# Patient Record
Sex: FEMALE | Race: BLACK OR AFRICAN AMERICAN | NOT HISPANIC OR LATINO | Employment: FULL TIME | URBAN - METROPOLITAN AREA
[De-identification: names, ages, dates, MRNs, and addresses within clinical notes are randomized per-mention and may not be internally consistent; named-entity substitution may affect disease eponyms.]

---

## 2024-03-23 ENCOUNTER — APPOINTMENT (OUTPATIENT)
Dept: RADIOLOGY | Facility: HOSPITAL | Age: 69
End: 2024-03-23

## 2024-03-23 ENCOUNTER — HOSPITAL ENCOUNTER (EMERGENCY)
Facility: HOSPITAL | Age: 69
Discharge: HOME | End: 2024-03-23
Attending: EMERGENCY MEDICINE
Payer: COMMERCIAL

## 2024-03-23 ENCOUNTER — APPOINTMENT (OUTPATIENT)
Dept: CARDIOLOGY | Facility: HOSPITAL | Age: 69
End: 2024-03-23

## 2024-03-23 VITALS
HEIGHT: 62 IN | RESPIRATION RATE: 18 BRPM | WEIGHT: 180 LBS | DIASTOLIC BLOOD PRESSURE: 65 MMHG | TEMPERATURE: 98.1 F | OXYGEN SATURATION: 96 % | HEART RATE: 62 BPM | SYSTOLIC BLOOD PRESSURE: 139 MMHG | BODY MASS INDEX: 33.13 KG/M2

## 2024-03-23 DIAGNOSIS — R06.09 DYSPNEA ON EFFORT: ICD-10-CM

## 2024-03-23 DIAGNOSIS — I71.20 THORACIC AORTIC ANEURYSM WITHOUT RUPTURE, UNSPECIFIED PART (CMS-HCC): ICD-10-CM

## 2024-03-23 DIAGNOSIS — J45.901 EXACERBATION OF ASTHMA, UNSPECIFIED ASTHMA SEVERITY, UNSPECIFIED WHETHER PERSISTENT (HHS-HCC): Primary | ICD-10-CM

## 2024-03-23 LAB
ALBUMIN SERPL BCP-MCNC: 4.1 G/DL (ref 3.4–5)
ALP SERPL-CCNC: 65 U/L (ref 33–136)
ALT SERPL W P-5'-P-CCNC: 27 U/L (ref 7–45)
ANION GAP SERPL CALC-SCNC: 11 MMOL/L (ref 10–20)
AST SERPL W P-5'-P-CCNC: 26 U/L (ref 9–39)
BASOPHILS # BLD AUTO: 0.02 X10*3/UL (ref 0–0.1)
BASOPHILS NFR BLD AUTO: 0.5 %
BILIRUB SERPL-MCNC: 0.7 MG/DL (ref 0–1.2)
BNP SERPL-MCNC: 77 PG/ML (ref 0–99)
BUN SERPL-MCNC: 11 MG/DL (ref 6–23)
CALCIUM SERPL-MCNC: 9.4 MG/DL (ref 8.6–10.3)
CARDIAC TROPONIN I PNL SERPL HS: <3 NG/L (ref 0–13)
CARDIAC TROPONIN I PNL SERPL HS: <3 NG/L (ref 0–13)
CHLORIDE SERPL-SCNC: 105 MMOL/L (ref 98–107)
CO2 SERPL-SCNC: 26 MMOL/L (ref 21–32)
CREAT SERPL-MCNC: 0.87 MG/DL (ref 0.5–1.05)
D DIMER PPP FEU-MCNC: 1174 NG/ML FEU
EGFRCR SERPLBLD CKD-EPI 2021: 72 ML/MIN/1.73M*2
EOSINOPHIL # BLD AUTO: 0.1 X10*3/UL (ref 0–0.7)
EOSINOPHIL NFR BLD AUTO: 2.3 %
ERYTHROCYTE [DISTWIDTH] IN BLOOD BY AUTOMATED COUNT: 13.9 % (ref 11.5–14.5)
FLUAV RNA RESP QL NAA+PROBE: NOT DETECTED
FLUBV RNA RESP QL NAA+PROBE: NOT DETECTED
GLUCOSE SERPL-MCNC: 90 MG/DL (ref 74–99)
HCT VFR BLD AUTO: 37.7 % (ref 36–46)
HGB BLD-MCNC: 11.6 G/DL (ref 12–16)
IMM GRANULOCYTES # BLD AUTO: 0.01 X10*3/UL (ref 0–0.7)
IMM GRANULOCYTES NFR BLD AUTO: 0.2 % (ref 0–0.9)
LYMPHOCYTES # BLD AUTO: 2.26 X10*3/UL (ref 1.2–4.8)
LYMPHOCYTES NFR BLD AUTO: 51 %
MAGNESIUM SERPL-MCNC: 1.75 MG/DL (ref 1.6–2.4)
MCH RBC QN AUTO: 23.9 PG (ref 26–34)
MCHC RBC AUTO-ENTMCNC: 30.8 G/DL (ref 32–36)
MCV RBC AUTO: 78 FL (ref 80–100)
MONOCYTES # BLD AUTO: 0.29 X10*3/UL (ref 0.1–1)
MONOCYTES NFR BLD AUTO: 6.5 %
NEUTROPHILS # BLD AUTO: 1.75 X10*3/UL (ref 1.2–7.7)
NEUTROPHILS NFR BLD AUTO: 39.5 %
NRBC BLD-RTO: 0 /100 WBCS (ref 0–0)
PLATELET # BLD AUTO: 190 X10*3/UL (ref 150–450)
POTASSIUM SERPL-SCNC: 4.2 MMOL/L (ref 3.5–5.3)
PROT SERPL-MCNC: 7.9 G/DL (ref 6.4–8.2)
RBC # BLD AUTO: 4.85 X10*6/UL (ref 4–5.2)
SARS-COV-2 RNA RESP QL NAA+PROBE: NOT DETECTED
SODIUM SERPL-SCNC: 138 MMOL/L (ref 136–145)
WBC # BLD AUTO: 4.4 X10*3/UL (ref 4.4–11.3)

## 2024-03-23 PROCEDURE — 71275 CT ANGIOGRAPHY CHEST: CPT | Performed by: RADIOLOGY

## 2024-03-23 PROCEDURE — 71045 X-RAY EXAM CHEST 1 VIEW: CPT

## 2024-03-23 PROCEDURE — 85379 FIBRIN DEGRADATION QUANT: CPT | Performed by: EMERGENCY MEDICINE

## 2024-03-23 PROCEDURE — 84484 ASSAY OF TROPONIN QUANT: CPT | Performed by: EMERGENCY MEDICINE

## 2024-03-23 PROCEDURE — 83735 ASSAY OF MAGNESIUM: CPT | Performed by: EMERGENCY MEDICINE

## 2024-03-23 PROCEDURE — 94640 AIRWAY INHALATION TREATMENT: CPT

## 2024-03-23 PROCEDURE — 85025 COMPLETE CBC W/AUTO DIFF WBC: CPT | Performed by: EMERGENCY MEDICINE

## 2024-03-23 PROCEDURE — 2550000001 HC RX 255 CONTRASTS: Performed by: EMERGENCY MEDICINE

## 2024-03-23 PROCEDURE — 71275 CT ANGIOGRAPHY CHEST: CPT

## 2024-03-23 PROCEDURE — 87636 SARSCOV2 & INF A&B AMP PRB: CPT | Performed by: EMERGENCY MEDICINE

## 2024-03-23 PROCEDURE — 2500000004 HC RX 250 GENERAL PHARMACY W/ HCPCS (ALT 636 FOR OP/ED): Performed by: EMERGENCY MEDICINE

## 2024-03-23 PROCEDURE — 93005 ELECTROCARDIOGRAM TRACING: CPT

## 2024-03-23 PROCEDURE — 36415 COLL VENOUS BLD VENIPUNCTURE: CPT | Performed by: EMERGENCY MEDICINE

## 2024-03-23 PROCEDURE — 99285 EMERGENCY DEPT VISIT HI MDM: CPT | Mod: 25

## 2024-03-23 PROCEDURE — 71045 X-RAY EXAM CHEST 1 VIEW: CPT | Performed by: STUDENT IN AN ORGANIZED HEALTH CARE EDUCATION/TRAINING PROGRAM

## 2024-03-23 PROCEDURE — 2500000002 HC RX 250 W HCPCS SELF ADMINISTERED DRUGS (ALT 637 FOR MEDICARE OP, ALT 636 FOR OP/ED): Performed by: EMERGENCY MEDICINE

## 2024-03-23 PROCEDURE — 80053 COMPREHEN METABOLIC PANEL: CPT | Performed by: EMERGENCY MEDICINE

## 2024-03-23 PROCEDURE — 83880 ASSAY OF NATRIURETIC PEPTIDE: CPT | Performed by: EMERGENCY MEDICINE

## 2024-03-23 RX ORDER — ALBUTEROL SULFATE 90 UG/1
2 AEROSOL, METERED RESPIRATORY (INHALATION) EVERY 4 HOURS PRN
Qty: 18 G | Refills: 0 | Status: SHIPPED | OUTPATIENT
Start: 2024-03-23 | End: 2024-04-22

## 2024-03-23 RX ORDER — IPRATROPIUM BROMIDE AND ALBUTEROL SULFATE 2.5; .5 MG/3ML; MG/3ML
3 SOLUTION RESPIRATORY (INHALATION) ONCE
Status: COMPLETED | OUTPATIENT
Start: 2024-03-23 | End: 2024-03-23

## 2024-03-23 RX ORDER — PREDNISONE 20 MG/1
40 TABLET ORAL DAILY
Qty: 8 TABLET | Refills: 0 | Status: SHIPPED | OUTPATIENT
Start: 2024-03-23 | End: 2024-03-27

## 2024-03-23 RX ORDER — PREDNISONE 20 MG/1
60 TABLET ORAL ONCE
Status: COMPLETED | OUTPATIENT
Start: 2024-03-23 | End: 2024-03-23

## 2024-03-23 RX ADMIN — PREDNISONE 60 MG: 20 TABLET ORAL at 20:24

## 2024-03-23 RX ADMIN — IOHEXOL 75 ML: 350 INJECTION, SOLUTION INTRAVENOUS at 18:57

## 2024-03-23 RX ADMIN — SODIUM CHLORIDE 500 ML: 9 INJECTION, SOLUTION INTRAVENOUS at 15:58

## 2024-03-23 RX ADMIN — IPRATROPIUM BROMIDE AND ALBUTEROL SULFATE 3 ML: .5; 3 SOLUTION RESPIRATORY (INHALATION) at 15:40

## 2024-03-23 ASSESSMENT — LIFESTYLE VARIABLES
EVER HAD A DRINK FIRST THING IN THE MORNING TO STEADY YOUR NERVES TO GET RID OF A HANGOVER: NO
HAVE YOU EVER FELT YOU SHOULD CUT DOWN ON YOUR DRINKING: NO
HAVE PEOPLE ANNOYED YOU BY CRITICIZING YOUR DRINKING: NO
TOTAL SCORE: 0
EVER FELT BAD OR GUILTY ABOUT YOUR DRINKING: NO

## 2024-03-23 ASSESSMENT — HEART SCORE
RISK FACTORS: 1-2 RISK FACTORS
AGE: 65+
HISTORY: SLIGHTLY SUSPICIOUS
TROPONIN: LESS THAN OR EQUAL TO NORMAL LIMIT
HEART SCORE: 3
ECG: NORMAL

## 2024-03-23 ASSESSMENT — COLUMBIA-SUICIDE SEVERITY RATING SCALE - C-SSRS
2. HAVE YOU ACTUALLY HAD ANY THOUGHTS OF KILLING YOURSELF?: NO
1. IN THE PAST MONTH, HAVE YOU WISHED YOU WERE DEAD OR WISHED YOU COULD GO TO SLEEP AND NOT WAKE UP?: NO
6. HAVE YOU EVER DONE ANYTHING, STARTED TO DO ANYTHING, OR PREPARED TO DO ANYTHING TO END YOUR LIFE?: NO

## 2024-03-23 ASSESSMENT — PAIN DESCRIPTION - DESCRIPTORS: DESCRIPTORS: ACHING

## 2024-03-23 ASSESSMENT — PAIN SCALES - GENERAL
PAINLEVEL_OUTOF10: 0 - NO PAIN
PAINLEVEL_OUTOF10: 0 - NO PAIN
PAINLEVEL_OUTOF10: 3
PAINLEVEL_OUTOF10: 0 - NO PAIN

## 2024-03-23 ASSESSMENT — PAIN DESCRIPTION - PAIN TYPE: TYPE: ACUTE PAIN

## 2024-03-23 ASSESSMENT — PAIN - FUNCTIONAL ASSESSMENT: PAIN_FUNCTIONAL_ASSESSMENT: 0-10

## 2024-03-23 ASSESSMENT — PAIN DESCRIPTION - LOCATION: LOCATION: CHEST

## 2024-03-23 NOTE — ED PROVIDER NOTES
69-year-old female presents emergency department for chief complaint of shortness of breath.  Patient reports over the past 2 days she has been feeling more short of breath than normal.  She states the symptoms are worse with exertion.  She denies fevers pain, or congestion.  She states she has history of asthma but this does not feel like her usual asthma.  She does report some right-sided chest pain that she states improved with ibuprofen and Tylenol yesterday.  No abdominal pain, nausea, vomiting, dysuria, diarrhea, constipation, black or bloody stools.  Patient reports she has been using her home aerosol treatments without relief.  She denies any history of DVT or PE, recent hospitalizations, recent surgery, or calf pain/swelling. She denies any tobacco use, illicit drug use, or regular alcohol use.  Chart review shows significant past medical history of hypothyroidism, asthma, and kidney stones.  Patient is not on any anticoagulation.  She states she has not been on steroids for over a year.      History provided by:  Patient   used: No           ------------------------------------------------------------------------------------------------------------------------------------------    VS: As documented in the triage note and EMR flowsheet from this visit were reviewed.    Review of Systems  Constitutional: no fever, chills reports fatigue  Eyes: no redness, discharge, pain  HENT: no sore throat, nose bleeds, congestion, rhinorrhea   Cardiovascular: Admits to right-sided chest pain that has resolved no, leg edema, palpitations  Respiratory: Reports shortness of breath no cough, wheezing  GI: no nausea, diarrhea, pain, vomiting, constipation, BRBPR, melena  : no dysuria, frequency, hematuria  Musculoskeletal: no neck pain, stiffness,  no joint deformity, swelling  Skin: no rash, erythema, wounds  Neurological: no headache, dizziness, lightheadedness, weakness, numbness, or  tingling  Psychiatric: no suicidal thoughts, confusion, agitation  Metabolic: no polyuria or polydipsia  Hematologic: no increased bleeding or bruising  Immunology: No immunocompromise state    [facility]  Nursing notes reviewed and confirmed by me.  Chart review performed including medications, allergies, and medical, surgical, and family history  Visit Vitals  /65 (BP Location: Right arm, Patient Position: Sitting)   Pulse 62   Temp 36.7 °C (98.1 °F) (Temporal)   Resp 18     Physical Exam  Vitals and nursing note reviewed.   Constitutional:       General: She is not in acute distress.     Appearance: Normal appearance. She is not ill-appearing.   HENT:      Head: Normocephalic and atraumatic.      Right Ear: External ear normal.      Left Ear: External ear normal.      Nose: Nose normal. No congestion or rhinorrhea.      Mouth/Throat:      Mouth: Mucous membranes are moist.      Pharynx: No oropharyngeal exudate or posterior oropharyngeal erythema.   Eyes:      Extraocular Movements: Extraocular movements intact.      Conjunctiva/sclera: Conjunctivae normal.      Pupils: Pupils are equal, round, and reactive to light.   Cardiovascular:      Rate and Rhythm: Normal rate and regular rhythm.      Pulses: Normal pulses.      Heart sounds: Normal heart sounds.   Pulmonary:      Effort: Pulmonary effort is normal. No respiratory distress.      Breath sounds: No stridor. No wheezing, rhonchi or rales.      Comments: Slightly diminished breath sounds bilaterally.  Abdominal:      General: There is no distension.      Palpations: Abdomen is soft.      Tenderness: There is no abdominal tenderness. There is no guarding or rebound.   Musculoskeletal:         General: No swelling or deformity. Normal range of motion.      Cervical back: Normal range of motion and neck supple. No rigidity.      Right lower leg: No edema.      Left lower leg: No edema.      Comments: No calf tenderness    Skin:     General: Skin is warm and  dry.      Capillary Refill: Capillary refill takes less than 2 seconds.      Coloration: Skin is not jaundiced.      Findings: No rash.   Neurological:      General: No focal deficit present.      Mental Status: She is alert and oriented to person, place, and time.      Sensory: No sensory deficit.      Motor: No weakness.   Psychiatric:         Mood and Affect: Mood normal.         Behavior: Behavior normal.        No past medical history on file.   Past Surgical History:   Procedure Laterality Date     ASPIRATION INJECTION INTERMEDIATE JOINT  1/10/2020     ASPIRATION INJECTION INTERMEDIATE JOINT 1/10/2020 [facility]      Social History     Socioeconomic History    Marital status:      Spouse name: Not on file    Number of children: Not on file    Years of education: Not on file    Highest education level: Not on file   Occupational History    Not on file   Tobacco Use    Smoking status: Not on file    Smokeless tobacco: Not on file   Substance and Sexual Activity    Alcohol use: Not on file    Drug use: Not on file    Sexual activity: Not on file   Other Topics Concern    Not on file   Social History Narrative    Not on file     Social Determinants of Health     Financial Resource Strain: Not on file   Food Insecurity: Not on file   Transportation Needs: Not on file   Physical Activity: Not on file   Stress: Not on file   Social Connections: Not on file   Intimate Partner Violence: Not on file   Housing Stability: Not on file      ------------------------------------------------------------------------------------------------------------------------------------------  CT angio chest for pulmonary embolism   Final Result   1. No evidence of pulmonary embolism to the level of the segmental   arteries. Small subsegmental filling defects can not be entirely   excluded.   2. Enlargement of the main pulmonary artery suggesting pulmonary   arterial hypertension.   3. Borderline aneurysmal dilatation  of the ascending thoracic aorta   similar to prior exam. Continued surveillance recommended.   4. No focal airspace disease.        MACRO:   None        Signed by: Reji Wolf 3/23/2024 7:43 PM   Dictation workstation:   [id number]      XR chest 1 view   Final Result   No acute cardiopulmonary process.             MACRO:   None        Signed by: Radha Solorzano 3/23/2024 4:46 PM   Dictation workstation:   [id number]         Labs Reviewed   CBC WITH AUTO DIFFERENTIAL - Abnormal       Result Value    WBC 4.4      nRBC 0.0      RBC 4.85      Hemoglobin 11.6 (*)     Hematocrit 37.7      MCV 78 (*)     MCH 23.9 (*)     MCHC 30.8 (*)     RDW 13.9      Platelets 190      Neutrophils % 39.5      Immature Granulocytes %, Automated 0.2      Lymphocytes % 51.0      Monocytes % 6.5      Eosinophils % 2.3      Basophils % 0.5      Neutrophils Absolute 1.75      Immature Granulocytes Absolute, Automated 0.01      Lymphocytes Absolute 2.26      Monocytes Absolute 0.29      Eosinophils Absolute 0.10      Basophils Absolute 0.02     D-DIMER, VTE EXCLUSION - Abnormal    D-Dimer, Quantitative VTE Exclusion 1,174 (*)     Narrative:     The VTE Exclusion D-Dimer assay is reported in ng/mL Fibrinogen Equivalent Units (FEU).    Per 's instructions for use, a value of less than 500 ng/mL (FEU) may help to exclude DVT or PE in outpatients when the assay is used with a clinical pretest probability assessment.([facility] must utilize and document eCalc 'Wells Score Deep Vein Thrombosis Risk' for DVT exclusion only. Emergency Department should utilize  Guidelines for Emergency Department Use of the VTE Exclusion D-Dimer and Clinical Pretest probability assessment model for DVT or PE exclusion.)   COMPREHENSIVE METABOLIC PANEL - Normal    Glucose 90      Sodium 138      Potassium 4.2      Chloride 105      Bicarbonate 26      Anion Gap 11      Urea Nitrogen 11      Creatinine 0.87      eGFR 72      Calcium 9.4      Albumin 4.1       Alkaline Phosphatase 65      Total Protein 7.9      AST 26      Bilirubin, Total 0.7      ALT 27     MAGNESIUM - Normal    Magnesium 1.75     B-TYPE NATRIURETIC PEPTIDE - Normal    BNP 77      Narrative:        <100 pg/mL - Heart failure unlikely  100-299 pg/mL - Intermediate probability of acute heart                  failure exacerbation. Correlate with clinical                  context and patient history.    >=300 pg/mL - Heart Failure likely. Correlate with clinical                  context and patient history.    BNP testing is performed using different testing methodology at [facility] than at other [facility] hospitals. Direct result comparisons should only be made within the same method.      SARS-COV-2 AND INFLUENZA A/B PCR - Normal    Flu A Result Not Detected      Flu B Result Not Detected      Coronavirus 2019, PCR Not Detected      Narrative:     This assay has received FDA Emergency Use Authorization (EUA) and  is only authorized for the duration of time that circumstances exist to justify the authorization of the emergency use of in vitro diagnostic tests for the detection of SARS-CoV-2 virus and/or diagnosis of COVID-19 infection under section 564(b)(1) of the Act, 21 U.S.C. 360bbb-3(b)(1). Testing for SARS-CoV-2 is only recommended for patients who meet current clinical and/or epidemiological criteria as defined by federal, state, or local public health directives. This assay is an in vitro diagnostic nucleic acid amplification test for the qualitative detection of SARS-CoV-2, Influenza A, and Influenza B from nasopharyngeal specimens and has been validated for use at [facility]. Negative results do not preclude COVID-19 infections or Influenza A/B infections, and should not be used as the sole basis for diagnosis, treatment, or other management decisions. If Influenza A/B and RSV PCR results are negative, testing for Parainfluenza virus, Adenovirus and  Metapneumovirus is routinely performed for [facility] pediatric oncology and intensive care inpatients, and is available on other patients by placing an add-on request.    SERIAL TROPONIN-INITIAL - Normal    Troponin I, High Sensitivity <3      Narrative:     Less than 99th percentile of normal range cutoff-  Female and children under 18 years old <14 ng/L; Male <21 ng/L: Negative  Repeat testing should be performed if clinically indicated.     Female and children under 18 years old 14-50 ng/L; Male 21-50 ng/L:  Consistent with possible cardiac damage and possible increased clinical   risk. Serial measurements may help to assess extent of myocardial damage.     >50 ng/L: Consistent with cardiac damage, increased clinical risk and  myocardial infarction. Serial measurements may help assess extent of   myocardial damage.      NOTE: Children less than 1 year old may have higher baseline troponin   levels and results should be interpreted in conjunction with the overall   clinical context.     NOTE: Troponin I testing is performed using a different   testing methodology at [facility] than at other   [facility]. Direct result comparisons should only   be made within the same method.   SERIAL TROPONIN, 1 HOUR - Normal    Troponin I, High Sensitivity <3      Narrative:     Less than 99th percentile of normal range cutoff-  Female and children under 18 years old <14 ng/L; Male <21 ng/L: Negative  Repeat testing should be performed if clinically indicated.     Female and children under 18 years old 14-50 ng/L; Male 21-50 ng/L:  Consistent with possible cardiac damage and possible increased clinical   risk. Serial measurements may help to assess extent of myocardial damage.     >50 ng/L: Consistent with cardiac damage, increased clinical risk and  myocardial infarction. Serial measurements may help assess extent of   myocardial damage.      NOTE: Children less than 1 year old may have higher baseline troponin    levels and results should be interpreted in conjunction with the overall   clinical context.     NOTE: Troponin I testing is performed using a different   testing methodology at [facility] than at other   [facility] hospitals. Direct result comparisons should only   be made within the same method.   TROPONIN SERIES- (INITIAL, 1 HR)    Narrative:     The following orders were created for panel order Troponin I Series, High Sensitivity (0, 1 HR).  Procedure                               Abnormality         Status                     ---------                               -----------         ------                     Troponin I, High Sensitiv...[[id number]]  Normal              Final result               Troponin, High Sensitivi...[[id number]]  Normal              Final result                 Please view results for these tests on the individual orders.        Medical Decision Making  EKG interpreted by ED physician: Normal sinus rhythm rate of 60.  PA, QRS, QTc intervals all within normal limits.  No significant ST elevations or depressions.  No significant Q waves.  Good R wave progression.  Normal axis.    EKG interpreted by ED physician: Sinus bradycardia rate of 58.  PA, QRS, QTc intervals are within normal limits.  No significant ST elevations or depressions.  No significant Q waves.  Good R wave progression.  Normal axis.    69-year-old female presents emergency department with chief complaint of shortness of breath worse with exertion.  On my exam the patient is afebrile nontoxic.  She is not in any respiratory distress, but has slightly diminished breath sounds.  Patient given aerosol treatment and on reevaluation has improved along movement.  She also reports that her symptoms have improved some.  She is subsequently given prednisone for presumed asthma exacerbation.  Cardiac enzymes x 2 and EKG showed no findings of acute ACS.  Patient does not have significantly elevated heart score.  Flu and  COVID testing are negative.  CMP shows no significant metabolic abnormalities.  CBC shows mild anemia that is at baseline no significant leukocytosis patient does not have findings of sepsis.  BNP within normal range and not suspect heart failure.  Chest x-ray shows no acute cardiopulmonary process such as pneumonia, pleural effusion, pulmonary edema.  Patient's D-dimer was positive and subsequent PE study does not show pulmonary embolus, but does show findings of pulmonary hypertension as well as some borderline aneurysmal dilation of the ascending thoracic aorta.  I advised patient of this incidental finding.  I recommended patient follow-up with cardiology regarding her symptoms.  Patient also advised to follow-up with her primary care doctor and she states she follows with a pulmonologist as well.  Patient given prednisone for home and refill of her albuterol.  We discussed reasons to return to the ED.  Patient comfortable returning home.  She was ambulated in department and maintained adequate oxygen saturation on room air.       Diagnoses as of 03/23/24 2334   Exacerbation of asthma, unspecified asthma severity, unspecified whether persistent   Dyspnea on effort   Thoracic aortic aneurysm without rupture, unspecified part (CMS/HCC) - borderline dilation       1. Exacerbation of asthma, unspecified asthma severity, unspecified whether persistent  albuterol 90 mcg/actuation inhaler    predniSONE (Deltasone) 20 mg tablet      2. Dyspnea on effort        3. Thoracic aortic aneurysm without rupture, unspecified part (CMS/HCC)      borderline dilation          Procedures     This note was dictated using dragon software and may contain errors related to dictation interpretation errors.      Hoang Hilton, DO  03/23/24 [id number]

## 2024-03-24 LAB
ATRIAL RATE: 58 BPM
ATRIAL RATE: 60 BPM
P AXIS: 51 DEGREES
P AXIS: 59 DEGREES
P OFFSET: 192 MS
P OFFSET: 196 MS
P ONSET: 135 MS
P ONSET: 135 MS
PR INTERVAL: 170 MS
PR INTERVAL: 184 MS
Q ONSET: 220 MS
Q ONSET: 227 MS
QRS COUNT: 9 BEATS
QRS COUNT: 9 BEATS
QRS DURATION: 86 MS
QRS DURATION: 86 MS
QT INTERVAL: 410 MS
QT INTERVAL: 426 MS
QTC CALCULATION(BAZETT): 410 MS
QTC CALCULATION(BAZETT): 418 MS
QTC FREDERICIA: 410 MS
QTC FREDERICIA: 421 MS
R AXIS: -6 DEGREES
R AXIS: 72 DEGREES
T AXIS: 14 DEGREES
T AXIS: 36 DEGREES
T OFFSET: 425 MS
T OFFSET: 440 MS
VENTRICULAR RATE: 58 BPM
VENTRICULAR RATE: 60 BPM

## 2024-03-24 NOTE — DISCHARGE INSTRUCTIONS
Follow up with your primary care doctor, cardiology and pulmonology. Return to the ER if symptoms worsen or change. Use treatments every 4 hours while symptomatic. Take medications as prescribed. You had an incidental finding of dilation of your thoracic aorta. This should be monitored by your doctor.

## 2024-10-18 ENCOUNTER — LAB (OUTPATIENT)
Dept: LAB | Facility: LAB | Age: 69
End: 2024-10-18
Payer: MEDICARE

## 2024-10-18 DIAGNOSIS — N18.2 CHRONIC KIDNEY DISEASE, STAGE 2 (MILD): Primary | ICD-10-CM

## 2024-10-18 DIAGNOSIS — E78.49 OTHER HYPERLIPIDEMIA: ICD-10-CM

## 2024-10-18 DIAGNOSIS — E03.8 OTHER SPECIFIED HYPOTHYROIDISM: ICD-10-CM

## 2024-10-18 LAB
ALBUMIN SERPL BCP-MCNC: 4.3 G/DL (ref 3.4–5)
ALP SERPL-CCNC: 64 U/L (ref 33–136)
ALT SERPL W P-5'-P-CCNC: 28 U/L (ref 7–45)
ANION GAP SERPL CALC-SCNC: 10 MMOL/L (ref 10–20)
AST SERPL W P-5'-P-CCNC: 27 U/L (ref 9–39)
BASOPHILS # BLD AUTO: 0.02 X10*3/UL (ref 0–0.1)
BASOPHILS NFR BLD AUTO: 0.4 %
BILIRUB SERPL-MCNC: 0.8 MG/DL (ref 0–1.2)
BUN SERPL-MCNC: 21 MG/DL (ref 6–23)
CALCIUM SERPL-MCNC: 9.5 MG/DL (ref 8.6–10.3)
CHLORIDE SERPL-SCNC: 104 MMOL/L (ref 98–107)
CHOLEST SERPL-MCNC: 193 MG/DL (ref 0–199)
CHOLESTEROL/HDL RATIO: 2.1
CO2 SERPL-SCNC: 29 MMOL/L (ref 21–32)
CREAT SERPL-MCNC: 1 MG/DL (ref 0.5–1.05)
EGFRCR SERPLBLD CKD-EPI 2021: 61 ML/MIN/1.73M*2
EOSINOPHIL # BLD AUTO: 0.15 X10*3/UL (ref 0–0.7)
EOSINOPHIL NFR BLD AUTO: 3.2 %
ERYTHROCYTE [DISTWIDTH] IN BLOOD BY AUTOMATED COUNT: 14.3 % (ref 11.5–14.5)
GLUCOSE SERPL-MCNC: 89 MG/DL (ref 74–99)
HCT VFR BLD AUTO: 37.6 % (ref 36–46)
HDLC SERPL-MCNC: 91.3 MG/DL
HGB BLD-MCNC: 11.4 G/DL (ref 12–16)
IMM GRANULOCYTES # BLD AUTO: 0 X10*3/UL (ref 0–0.7)
IMM GRANULOCYTES NFR BLD AUTO: 0 % (ref 0–0.9)
LDLC SERPL CALC-MCNC: 85 MG/DL
LYMPHOCYTES # BLD AUTO: 2.5 X10*3/UL (ref 1.2–4.8)
LYMPHOCYTES NFR BLD AUTO: 53.3 %
MCH RBC QN AUTO: 23.9 PG (ref 26–34)
MCHC RBC AUTO-ENTMCNC: 30.3 G/DL (ref 32–36)
MCV RBC AUTO: 79 FL (ref 80–100)
MONOCYTES # BLD AUTO: 0.37 X10*3/UL (ref 0.1–1)
MONOCYTES NFR BLD AUTO: 7.9 %
NEUTROPHILS # BLD AUTO: 1.65 X10*3/UL (ref 1.2–7.7)
NEUTROPHILS NFR BLD AUTO: 35.2 %
NON HDL CHOLESTEROL: 102 MG/DL (ref 0–149)
NRBC BLD-RTO: 0 /100 WBCS (ref 0–0)
PLATELET # BLD AUTO: 188 X10*3/UL (ref 150–450)
POTASSIUM SERPL-SCNC: 4.4 MMOL/L (ref 3.5–5.3)
PROT SERPL-MCNC: 7.5 G/DL (ref 6.4–8.2)
RBC # BLD AUTO: 4.77 X10*6/UL (ref 4–5.2)
SODIUM SERPL-SCNC: 139 MMOL/L (ref 136–145)
TRIGL SERPL-MCNC: 84 MG/DL (ref 0–149)
TSH SERPL-ACNC: 1.05 MIU/L (ref 0.44–3.98)
VLDL: 17 MG/DL (ref 0–40)
WBC # BLD AUTO: 4.7 X10*3/UL (ref 4.4–11.3)

## 2024-10-18 PROCEDURE — 84443 ASSAY THYROID STIM HORMONE: CPT

## 2024-10-18 PROCEDURE — 80053 COMPREHEN METABOLIC PANEL: CPT

## 2024-10-18 PROCEDURE — 36415 COLL VENOUS BLD VENIPUNCTURE: CPT

## 2024-10-18 PROCEDURE — 85025 COMPLETE CBC W/AUTO DIFF WBC: CPT

## 2024-10-18 PROCEDURE — 80061 LIPID PANEL: CPT

## 2024-10-23 ENCOUNTER — HOSPITAL ENCOUNTER (OUTPATIENT)
Dept: RADIOLOGY | Facility: CLINIC | Age: 69
Discharge: HOME | End: 2024-10-23
Payer: MEDICARE

## 2024-10-23 VITALS — BODY MASS INDEX: 33.13 KG/M2 | WEIGHT: 180 LBS | HEIGHT: 62 IN

## 2024-10-23 DIAGNOSIS — Z13.820 ENCOUNTER FOR SCREENING FOR OSTEOPOROSIS: ICD-10-CM

## 2024-10-23 DIAGNOSIS — Z78.0 ASYMPTOMATIC MENOPAUSAL STATE: ICD-10-CM

## 2024-10-23 DIAGNOSIS — Z12.31 ENCOUNTER FOR SCREENING MAMMOGRAM FOR MALIGNANT NEOPLASM OF BREAST: ICD-10-CM

## 2024-10-23 PROCEDURE — 77067 SCR MAMMO BI INCL CAD: CPT

## 2024-10-23 PROCEDURE — 77080 DXA BONE DENSITY AXIAL: CPT

## 2024-10-23 PROCEDURE — 77063 BREAST TOMOSYNTHESIS BI: CPT | Performed by: RADIOLOGY

## 2024-10-23 PROCEDURE — 77080 DXA BONE DENSITY AXIAL: CPT | Performed by: RADIOLOGY

## 2024-10-23 PROCEDURE — 77067 SCR MAMMO BI INCL CAD: CPT | Performed by: RADIOLOGY

## 2024-11-26 ENCOUNTER — HOSPITAL ENCOUNTER (OUTPATIENT)
Dept: RADIOLOGY | Facility: HOSPITAL | Age: 69
Discharge: HOME | End: 2024-11-26
Payer: MEDICARE

## 2024-11-26 DIAGNOSIS — R92.8 OTHER ABNORMAL AND INCONCLUSIVE FINDINGS ON DIAGNOSTIC IMAGING OF BREAST: ICD-10-CM

## 2024-11-26 PROCEDURE — 76642 ULTRASOUND BREAST LIMITED: CPT | Mod: LEFT SIDE | Performed by: RADIOLOGY

## 2024-11-26 PROCEDURE — G0279 TOMOSYNTHESIS, MAMMO: HCPCS | Mod: LEFT SIDE | Performed by: RADIOLOGY

## 2024-11-26 PROCEDURE — 77061 BREAST TOMOSYNTHESIS UNI: CPT | Mod: LT

## 2024-11-26 PROCEDURE — 76642 ULTRASOUND BREAST LIMITED: CPT | Mod: LT

## 2024-11-26 PROCEDURE — 77065 DX MAMMO INCL CAD UNI: CPT | Mod: LEFT SIDE | Performed by: RADIOLOGY

## 2024-12-10 ENCOUNTER — HOSPITAL ENCOUNTER (OUTPATIENT)
Dept: RADIOLOGY | Facility: CLINIC | Age: 69
Discharge: HOME | End: 2024-12-10
Payer: MEDICARE

## 2024-12-10 ENCOUNTER — OFFICE VISIT (OUTPATIENT)
Dept: ORTHOPEDIC SURGERY | Facility: CLINIC | Age: 69
End: 2024-12-10
Payer: MEDICARE

## 2024-12-10 DIAGNOSIS — M54.50 LUMBAR PAIN: ICD-10-CM

## 2024-12-10 DIAGNOSIS — M54.10 BACK PAIN WITH RADICULOPATHY: ICD-10-CM

## 2024-12-10 DIAGNOSIS — M54.12 CERVICAL RADICULOPATHY: Primary | ICD-10-CM

## 2024-12-10 DIAGNOSIS — M54.12 CERVICAL RADICULOPATHY: ICD-10-CM

## 2024-12-10 PROCEDURE — 72110 X-RAY EXAM L-2 SPINE 4/>VWS: CPT | Performed by: RADIOLOGY

## 2024-12-10 PROCEDURE — 1159F MED LIST DOCD IN RCRD: CPT | Performed by: PHYSICIAN ASSISTANT

## 2024-12-10 PROCEDURE — 72050 X-RAY EXAM NECK SPINE 4/5VWS: CPT | Performed by: RADIOLOGY

## 2024-12-10 PROCEDURE — 1036F TOBACCO NON-USER: CPT | Performed by: PHYSICIAN ASSISTANT

## 2024-12-10 PROCEDURE — 72110 X-RAY EXAM L-2 SPINE 4/>VWS: CPT

## 2024-12-10 PROCEDURE — 72050 X-RAY EXAM NECK SPINE 4/5VWS: CPT

## 2024-12-10 PROCEDURE — 99214 OFFICE O/P EST MOD 30 MIN: CPT | Performed by: PHYSICIAN ASSISTANT

## 2024-12-10 NOTE — PROGRESS NOTES
Established patient to the practice today seen over 2 years ago.  Comes the office complaining of follow-up persistent back pain going down the left leg she was doing okay for a while there.  Now she is kind of semiretired this was initially [employer]'s [employer]. but now it is not.  She also complains of neck pain with burning to both her arms upper arms no numbness or tingling there is no bowel or bladder complaints no saddle anesthesia no gait or balance changes no recent trauma accidents or falls to cause it no spine surgeries in the past no recent treatment in the past.  No fevers chills nausea vomiting or night sweats.    We looked at patient's recent blood work.  Checked a metabolic panel glucose 89 sodium 139 potassium 4.4 patient had a recent bone density study that was done showing a -1.4 we looked at primary doctors notes please check medication list I do not see that she is on any type of statin medication to cause muscular aches and pains    Physical exam: Well-nourished, well kept.  No lymphangitis or lymphadenopathy in the examined extremities.  Good perfusion to the extremities ×4.  Radial and dorsalis pedis pulses 2+.  Capillary refill to all 4 digits brisk.  No distal edema x 4.  Gait normal.  Can walk on heels and toes.  Decreased range of motion flexion-extension rotation cervical spine tender good strength no instability.  Decreased range of motion flexion extension rotation lumbar spine tender good strength no instability.  Examination of the upper extremities reveals no point tenderness, swelling, or deformity.  Range of motion of the shoulders, elbows, wrists, and fingers are all full without crepitance, instability, or exacerbation of pain.  Strength is 5/5 throughout.  Examination of the lower extremities reveals no point tenderness, swelling, or deformity.  Range of motion of the hips, knees, and ankles are full without crepitance, instability, or exacerbation of pain.  Strength is 5/5  throughout.  No redness, abrasions, or lesions on all 4 extremities, head and neck, or trunk.  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  Compared to prior x-rays and report reviewed as well I do not see much change.  She also an MRI back then in 2022 showing a herniated disc at L4-5 with an extruded fragment that goes out and up into the canal up to the middle of the pedicle of the L4 vertebral body.  X-ray cervical spine taken today AP lateral show arthritic degenerative changes with no fractures.    Assessment: Patient with neck pain left leg symptoms.  Last MRI was 2 years ago her symptoms are actually worse right now that needs further treatment she also has neck pain with burning pain to both her arms which I think is cervical related so organ to get that evaluated.    Plan: Ordered some physical therapy done at our [facility] office.  Cervical and lumbar will get an MRI cervical and lumbar spine.  And she will follow-up after those films

## 2024-12-31 ENCOUNTER — HOSPITAL ENCOUNTER (OUTPATIENT)
Dept: RADIOLOGY | Facility: HOSPITAL | Age: 69
Discharge: HOME | End: 2024-12-31
Payer: MEDICARE

## 2024-12-31 DIAGNOSIS — M54.50 LUMBAR PAIN: ICD-10-CM

## 2024-12-31 DIAGNOSIS — M54.12 CERVICAL RADICULOPATHY: ICD-10-CM

## 2024-12-31 DIAGNOSIS — M54.10 BACK PAIN WITH RADICULOPATHY: ICD-10-CM

## 2024-12-31 PROCEDURE — 72141 MRI NECK SPINE W/O DYE: CPT

## 2024-12-31 PROCEDURE — 72141 MRI NECK SPINE W/O DYE: CPT | Performed by: RADIOLOGY

## 2024-12-31 PROCEDURE — 72148 MRI LUMBAR SPINE W/O DYE: CPT

## 2024-12-31 PROCEDURE — 72148 MRI LUMBAR SPINE W/O DYE: CPT | Performed by: RADIOLOGY

## 2025-01-02 ENCOUNTER — EVALUATION (OUTPATIENT)
Dept: PHYSICAL THERAPY | Facility: CLINIC | Age: 70
End: 2025-01-02
Payer: MEDICARE

## 2025-01-02 DIAGNOSIS — M54.50 LUMBAR PAIN: ICD-10-CM

## 2025-01-02 DIAGNOSIS — M54.12 CERVICAL RADICULOPATHY: ICD-10-CM

## 2025-01-02 DIAGNOSIS — M54.10 BACK PAIN WITH RADICULOPATHY: ICD-10-CM

## 2025-01-02 PROCEDURE — 97110 THERAPEUTIC EXERCISES: CPT | Mod: GP

## 2025-01-02 PROCEDURE — 97162 PT EVAL MOD COMPLEX 30 MIN: CPT | Mod: GP

## 2025-01-02 ASSESSMENT — ENCOUNTER SYMPTOMS
DEPRESSION: 0
LOSS OF SENSATION IN FEET: 1
OCCASIONAL FEELINGS OF UNSTEADINESS: 0

## 2025-01-02 NOTE — PROGRESS NOTES
Physical Therapy Evaluation    Patient Name: Ida Wynne  MRN: [id number]  PT Evaluation Time Entry  PT Evaluation (Moderate) Time Entry: 32  PT Therapeutic Procedures Time Entry  Therapeutic Exercise Time Entry: 8                 Current Problem  1. Lumbar pain  Referral to Physical Therapy      2. Cervical radiculopathy  Referral to Physical Therapy      3. Back pain with radiculopathy  Referral to Physical Therapy        Insurance    Insurance reviewed   Visit number: 1  [employer] BOA COPAY 40   DED 0 COVERAGE 100 OOP 0 AUTH IS REQ AFTER EVAL       Subjective   General:  Pt comes in today with complaints of both LBP and cervical pain with radicular symptoms into BL UE. She states at night she tends to have more pain and N/T. She states her low back does bother her more at this time. She states it feels like something is being pushed into her spine. She states she does have some radiation of pain into the LLE. She states she does get relief when she sleeps on her stomach. She reports this has been going on for about 3 months and started in October. She states they have ruled out any type of cardiac involvement. She states her pain is rated 4-5/10 in both neck and back. She denies any bowel/bladder incontinence, denies any saddle anesthesia, denies SoB, denies any fever or chills. She reports the hardest thing for her to do is bending over. She states the pain her back is midline. She reports her cervical pain is mainly just radiating into her arms which does not extend past the elbow at this time. Pt denies any vision changes, dizziness, LoC. She states her back is the worst as well when she sits for prolonged periods of time. PMH: Asthma, hysterectomy, umbilical hernia repair, thyroid surgery  Occupation: Semi-retired  PLOF: Independent with all activities  Goal for Therapy: Relief without having surgery  Home Environment: House, 2 ZHOU, 3 levels, lives with   R hand  dominant  Precautions:    Pain:  REDUCES SYMPTOMS: Tylenol - helps the back    PROVOKES SYMPTOMS: Bending over, sitting for prolonged periods of time, sleeping    Red Flags: Do you have any of the following? No  Fever/chills, unexplained weight changes, dizziness/fainting, unexplained change in bowel or bladder functions, unexplained malaise or muscle weakness, , numbness or tingling  Does report night sweats    Reviewed medical screening form with pt and medical screening assessed    Imaging:   IMPRESSION: MRI Cervical 12/31/24  Multilevel cervical spondylosis with bulging disc and marginal  osteophyte complex, greater at C3-4. There is moderate central canal  narrowing at this level with some effacement of the ventral surface  of the spinal cord to left of midline. There is some flattening of  the anterior surface of the spinal cord at C4-5 as well.      Facet and uncovertebral arthrosis with multilevel bilateral neural  foraminal narrowing which is greater at C3-4, C4-5, and C5-6 on the  left.      Straightening of the cervical lordosis. There is no acute osseous  abnormality.    IMPRESSION: MRI Lumbar 12/31/24  Mild multilevel discogenic degenerative changes of the lumbar spine  with multilevel bulging disc and mild central canal narrowing as  detailed above. There is mild multilevel neural foraminal narrowing  with mild to moderate narrowing at the L4-5 level, greater on the  left.      No acute osseous abnormality.  Objective   CERVICAL Examination          PALPATION: TTP BL UT            POSTURE: rounded shoulder, forward head posture    AROM    Cervical flexion: WNL    Cervical extension: WNL    Cervical sidebending Right: limited 25%   Sidebending Left: limited 25%    Cervical rotation Right: limited 25%    Rotation Left: limited 25%    MMT    Shoulder deltoid flexion right: 5    Deltoid flexion left: 5    (C5) Shoulder deltoid abduction right: 5   Deltoid abduction left: 5    Shoulder ER @ 0 degrees  abduction right: 5    ER @ 0 degrees abduction left: 5    Shoulder IR @ 0 degrees abduction right: 5    IR @ 0 degrees abduction left: 5    (C6) Biceps brachii right: 5    Biceps brachii left: 5    (C7) Triceps brachii right: 5    Triceps brachii left: 5    (C7) Wrist flexors right: 5      Wrist flexors left: 5      (C6) Wrist extensors right: 5     Wrist extensors left: 5      (C8) Thumb extension right: 4+      Thumb extension left: 4+      (T1) Finger abduction right: 4+      Finger abduction left: 4+       level 2 on right: 40       level 2 left: 30                    Special Tests  Spurlings:  Right -  Left -  Distraction: Right -  Left + (improvement in pain with light traction)  Rotation Less than 60 degrees to involved side: Right -  Left -  Transverse Ligament Test: Right -  Left -      LUMBAR Examination  PALPATION: TTP Sacrum, BL piriformis track              GAIT: Antalgic, trendelenburg            POSTURE: Dec lordosis in seated position    AROM    Lumbar flexion: Dec 50% P!      Lumbar extension: limited 25%      Lumbar sidebending right: limited 25%      Sidebending left: limited 25%      Lumbar rotation right: limited 25%     Rotation left: limited 25%      Hip flexion right:  0-90    Hip flexion left: 0-100      Hip ER supine right: WNL     Hip ER supine left: WNL      Hip IR supine right: WNL     Hip IR supine left: WNL      MMT:    (L3-L4) Right quadriceps: 4+     Left quadriceps: 4+      (L1-L2) Right iliopsoas: 5      Left iliopsoas: 4      (S2) Right hip abductors supine: 4+     Left hip abductors supine: 4+      (L1-L2) Right hip adductors supine: 4+     Left hip adductors supine: 4+      (L5-S1) Right gluteus daylin: 4     Left gluteus daylin: 4      (S1-S2) Right hamstrings: 5      Left hamstrings: 5      Lower abdominals: fair      Upper abdominals: fair      Flexibility:    Right iliopsoas: limited     Left iliopsoas: limited      Right rectus femoris:   limited    Left rectus femoris: limited      Right hamstring: limited     Left hamstring: limited      Right piriformis: limited      Left piriformis: limited      Right quadriceps: limited     Left quadriceps: limited        Level:   Dermatome:                  L1         WNL                   L2         WNL                                        L3         WNL                                          L4         WNL                                           L5         WNL                            S1         WNL                                                   Special Tests:  Active Straight Leg Raise Test: Right -  Left +  Prone Instability Test: Right -  Left -  Slump Test: Right -  Left -   Crossed Straight Leg Test: Right -  Left -      Outcome Measures:  Other Measures  Oswestry Disablity Index (MIKEY): 26%     Treatment: See HEP below  Measurements taken  Prone on elbows x 2mins - relief of radicular symptoms  Scap retractions x10  Supine piriformis stretch 2x30s  EDUCATION/HEP:  Access Code: [email or web address]  URL: [email or web address]  Date: 01/02/2025  Prepared by: Mounika Chowdhury    Exercises  - Supine Piriformis Stretch with Foot on Ground  - 1-2 x daily - 7 x weekly - 3 sets - 30s hold  - Static Prone on Elbows  - 1-2 x daily - 7 x weekly - 1 sets - 2 mins hold  - Seated Scapular Retraction  - 2 x daily - 7 x weekly - 2 sets - 10 reps  Assessment:  70 y/o pt who presents with cervical pain with radiation into BLUE, LBP with radiation into LLE, dec cervical/lumbar ROM, dec UE/LE strength, dec flexibility, dec functional mobility, and abnormal gait mechanics. Symptoms consistent with cervical radiculopathy and lumbar radiculopathy. Pt with relief of radicular in UE symptoms with gentle traction. Pt with relief of radicular in LLE symptoms in prone. Functional limitations include standing, walking, sitting for prolonged times, sleeping, and self care tasks. Pt denies any dizziness,  changes in vision, LoC, saddle anesthesia, bowel/bladder incontinence, or fever/chills. MRI performed on both cervical spine and lumbar spine. Pt will benefit from skilled therapy in order to improve pain, ROM, strength, flexibility, functional mobility, and gait mechanics.    Clinical Presentation: Stable     Level of Complexity:  Moderate     Goals:  Pt will report dec of 6% on NDI (MDC) in order to improve functional mobility  Pt will be independent with HEP  Pt will demonstrate an increase in neck ROM to WNL in order to return to ADLs  Pt will demonstrate global shoulder strength to 4+/5 in order to return to ADLs  Pt will report dec in pain to 0-1/10 in order to improve functional mobility  Pt will report 75% improvement in function in order to return to ADLs     Pt will report dec of 6% on MIKEY (MDC) in order to improve functional mobility  Pt will be independent with HEP  Pt will demonstrate an increase in Lumbar  ROM to WNL in order to return to ADLs  Pt will demonstrate global Hip strength to 4+/5 in order to return to ADLs  Pt will report dec in pain by 1-2 points in order to improve functional mobility  Pt will report 75% improvement in function in order to return to ADLs        Plan  Treatment/Interventions: Cryotherapy, Education/ Instruction, Gait training, Manual therapy, Mechanical traction, Neuromuscular re-education, Self care/ home management, Therapeutic activities, Therapeutic exercises  PT Plan: Skilled PT  PT Frequency: 1 time per week  Duration: 6 weeks  Onset Date: 10/01/24  Certification Period Start Date: 01/02/25  Certification Period End Date: 04/02/25  Number of Treatments Authorized: BOA  Rehab Potential: Good  Plan of Care Agreement: Patient     18

## 2025-01-10 ENCOUNTER — TREATMENT (OUTPATIENT)
Dept: PHYSICAL THERAPY | Facility: CLINIC | Age: 70
End: 2025-01-10
Payer: MEDICARE

## 2025-01-10 DIAGNOSIS — M54.10 BACK PAIN WITH RADICULOPATHY: ICD-10-CM

## 2025-01-10 DIAGNOSIS — M54.12 CERVICAL RADICULOPATHY: ICD-10-CM

## 2025-01-10 DIAGNOSIS — M54.50 LUMBAR PAIN: ICD-10-CM

## 2025-01-10 PROCEDURE — 97110 THERAPEUTIC EXERCISES: CPT | Mod: GP,CQ

## 2025-01-10 PROCEDURE — 97140 MANUAL THERAPY 1/> REGIONS: CPT | Mod: GP,CQ

## 2025-01-10 ASSESSMENT — PAIN - FUNCTIONAL ASSESSMENT: PAIN_FUNCTIONAL_ASSESSMENT: 0-10

## 2025-01-10 ASSESSMENT — PAIN SCALES - GENERAL: PAINLEVEL_OUTOF10: 3

## 2025-01-10 NOTE — PROGRESS NOTES
"Physical Therapy Treatment    Patient Name: Ida Wynne  MRN: [id number]  Today's Date: 1/10/2025  Time Calculation  Start Time: 1004  Stop Time: 1101  Time Calculation (min): 57 min  PT Therapeutic Procedures Time Entry  Manual Therapy Time Entry: 30  Therapeutic Exercise Time Entry: 25       Current Problem  1. Lumbar pain  Follow Up In Physical Therapy      2. Cervical radiculopathy  Follow Up In Physical Therapy      3. Back pain with radiculopathy  Follow Up In Physical Therapy          Subjective   General   LS: Pt stating some persistent R sided low back pain, tends to remain sharp. Denies much in the way of radicular symptoms to this point.Denies saddle anesthesia. George bladder/bowel incontinence. Gets some relief with prone laying    CS: Pt stating some L sided cervical tightness and pain that will radiate heaviness B upper UE as well as some numbness L UE into elbow and forearm. Gets HA 2-3 times a week, but is able to resolve on her own with positioning.    Insurance   Visit 2    [employer] APPROVED 6 PT VISITS 1-6-25 THRU 2-21-25  AUTH# [id number]  [id number]/ALL  Precautions     Pain  Pain Assessment: 0-10  0-10 (Numeric) Pain Score: 3    Objective   Treatments:  LS: Prone layin 2' x2  SLR supine/prone 2x10  Bridges 2x10  LTR 3\" x10  BARBARA 2x10    Manual  Grade 1-3 PA, Uni mobs lumbar spine  Sacral mobs  PROM B hip into ext, ER    CS: Supine laying with cervical roll + pillow 2'  Unloaded retractions 2x10  Thoracic ext stretch at counter 5\" 2x10    Manual  Grade 1- PA, Uni mobs cervical spine  Traction, traction with rotation  Cervical down glides    Assessment   LS: Reduction familiar symptoms extension forces lumbar spine both loaded and unloaded Added some light hip and lumbar strengthening ex's for reduced pain and improved tolerance activity. Almost full resolution familiar low back pain manual.     CS: Noted restriction motion L side C3-C5, markedly reduced following MTT with full resolution " B radicular symptoms. Tolerated unloaded retractions well, but difficulty with control loaded with some irritation cervical spine.     Plan:  Cont to progress strength and endurance     OP EDUCATION:   Access Code: [email or web address]  URL: [email or web address]  Date: 01/10/2025  Prepared by: Prakash George    Exercises  - Seated Correct Posture   - Sit to Stand   - Lying Prone  - 4-5 x daily - 7 x weekly - 1 sets - 1 reps - 3-5 hold  - Standing Lumbar Extension  - 4-5 x daily - 7 x weekly - 2-3 sets - 10 reps  - Supine Bridge  - 1-2 x daily - 7 x weekly - 1-2 sets - 10 reps  - Prone Hip Extension  - 1-2 x daily - 7 x weekly - 1-2 sets - 10 reps  - Small Range Straight Leg Raise  - 1-2 x daily - 7 x weekly - 1-2 sets - 10 reps  - Supine Cervical Retraction with Towel  - 4-5 x daily - 7 x weekly - 1-2 sets - 10 reps  - Seated Cervical Retraction  - 4-5 x daily - 7 x weekly - 2-3 sets - 10 reps  - Standing 'L' Stretch at Counter  - 4-5 x daily - 7 x weekly - 1-2 sets - 10 reps - 5-10 seconds hold    Goals:

## 2025-01-15 ENCOUNTER — TREATMENT (OUTPATIENT)
Dept: PHYSICAL THERAPY | Facility: CLINIC | Age: 70
End: 2025-01-15
Payer: MEDICARE

## 2025-01-15 DIAGNOSIS — M54.12 CERVICAL RADICULOPATHY: ICD-10-CM

## 2025-01-15 DIAGNOSIS — M54.50 LUMBAR PAIN: Primary | ICD-10-CM

## 2025-01-15 DIAGNOSIS — M54.10 BACK PAIN WITH RADICULOPATHY: ICD-10-CM

## 2025-01-15 PROCEDURE — 97140 MANUAL THERAPY 1/> REGIONS: CPT | Mod: GP

## 2025-01-15 PROCEDURE — 97110 THERAPEUTIC EXERCISES: CPT | Mod: GP

## 2025-01-15 NOTE — PROGRESS NOTES
Physical Therapy Treatment    Patient Name: Ida Wynne  MRN: [id number]  Time Calculation  Start Time: 1001  Stop Time: 1039  Time Calculation (min): 38 min     PT Therapeutic Procedures Time Entry  Manual Therapy Time Entry: 15  Therapeutic Exercise Time Entry: 23                   Current Problem  1. Lumbar pain  Follow Up In Physical Therapy      2. Cervical radiculopathy  Follow Up In Physical Therapy      3. Back pain with radiculopathy  Follow Up In Physical Therapy        Insurance   Visit 3/6     [employer] APPROVED 6 PT VISITS 1-6-25 THRU 2-21-25  AUTH# [id number]  [id number]/ALL    Subjective   General  Pt presents with some improvement in (B) UE Sx since last visit. No numbness and tingling in the legs , but c/o R sided LBP pain. She has been doing HEP/ laying prone when this happens and states that it helps.       Pain  5/10    Objective     Treatments:  LS: PPT 2 x 10   Bridges 2 x 10  HL clamshells red TB 2 x 10   LTR x 15ea  SLR with TA brace 2 x 10 ea  Prone on elbows- 2 mins   Prone leg extension 2 x 10ea    Manual  Grade 1-3 PA, Uni (R) mobs lumbar spine  Sacral mobs  MWM- leg extension with lumbar mobilization       CS: supine chin retractions 2 x 10   Seated scap retractions 2 x 10     Manual  Grade 1- PA, Uni mobs cervical spine  Traction, traction with rotation  Cervical down glides    -education on radicular Sx vs LBP or neck pain, centralization, dermatomes/myotomes      Assessment   Pt presents with some improvement in (B) UE Sx since last visit. No numbness and tingling in the legs , but c/o R sided LBP pain. She has been doing HEP/ laying prone when this happens and states that it helps. Pt able to decrease her R sided pain with exercises today. Pt able to tolerate both cervical and lumbar mobilizations. They recreated tenderness, but no radicular Sx at this point. She continues to benefit from skilled therapy in order to  progress towards her goals.    Plan   Continue current POC,  progress as tolerated

## 2025-01-22 ENCOUNTER — TREATMENT (OUTPATIENT)
Dept: PHYSICAL THERAPY | Facility: CLINIC | Age: 70
End: 2025-01-22
Payer: MEDICARE

## 2025-01-22 DIAGNOSIS — M54.50 LUMBAR PAIN: Primary | ICD-10-CM

## 2025-01-22 DIAGNOSIS — M54.10 BACK PAIN WITH RADICULOPATHY: ICD-10-CM

## 2025-01-22 DIAGNOSIS — M54.12 CERVICAL RADICULOPATHY: ICD-10-CM

## 2025-01-22 PROCEDURE — 97110 THERAPEUTIC EXERCISES: CPT | Mod: GP,CQ

## 2025-01-22 PROCEDURE — 97140 MANUAL THERAPY 1/> REGIONS: CPT | Mod: GP,CQ

## 2025-01-22 ASSESSMENT — PAIN - FUNCTIONAL ASSESSMENT: PAIN_FUNCTIONAL_ASSESSMENT: 0-10

## 2025-01-22 ASSESSMENT — PAIN SCALES - GENERAL: PAINLEVEL_OUTOF10: 3

## 2025-01-22 NOTE — PROGRESS NOTES
"Physical Therapy Treatment    Patient Name: Ida Wynne  MRN: [id number]  Today's Date: 1/22/2025  Time Calculation  Start Time: 0917  Stop Time: 0957  Time Calculation (min): 40 min     PT Therapeutic Procedures Time Entry  Manual Therapy Time Entry: 8  Therapeutic Exercise Time Entry: 32                 Insurance:   Visit 4/6     [employer] MEDICARE APPROVED 6 PT VISITS 1-6-25 THRU 2-21-25  AUTH# [id number]  [id number]/ALL  Assessment:   Major focus of today's session on low back as her cervical spine has been feeling better and she has had less instances of pain and HA. She had more discomfort/soreness following ex session last visit however it did subside. Pain w/ bridges initially however it did subside as her lumbar spine ROM improved. Pt able to complete x15 reps this visit. Increased tband resistance to green w/ hook lying clamshells w/ appropriate muscle fatigue observed. She reports relief w/ Manipulation w/ Movement in prone and prone position in general. Reviewed log roll upon completion of plinth ex's and added BARBARA to also help w/ lumbar pain and radicular symptoms when she is unable to lay prone. Pt is making nice progress towards her goals and will cont to benefit from skilled PT to address her deficits.     Plan:   Continue current POC, progress as tolerated     Current Problem  1. Lumbar pain  Follow Up In Physical Therapy      2. Cervical radiculopathy  Follow Up In Physical Therapy      3. Back pain with radiculopathy  Follow Up In Physical Therapy          Subjective   General   Pt states she was \"sore\" after the last visit. \"It felt like someone beat me up.\" Pt states the arms are \"much better\" but the low back \"is still about the same.\" Pt states her HA have also been better. Pt reports she is compliant w/ HEP and thinks it all is helping.   Precautions       Pain  Pain Assessment: 0-10  0-10 (Numeric) Pain Score: 3  Pain Location: Back    Objective       Treatments:  Therapeutic Exercise " (11080):   LS: PPT 2 x 10   Bridges x 15  HL clamshells green TB 2 x 10   LTR x 15ea  SLR with TA brace 2 x 10 ea  Prone on elbows- 2 mins   Prone leg extension 2 x 10ea  BARBARA x10    CS: supine chin retractions 2 x 10 --  Seated scap retractions 2 x 10 --     Manual ([id number]):  LS: Grade 1-3 PA, Uni (R) mobs lumbar spine  Sacral mobs  MWM- leg extension with lumbar mobilization     CS: Grade 1- PA, Uni mobs cervical spine  Traction, traction with rotation  Cervical down glides    EDUCATION:

## 2025-01-29 ENCOUNTER — TREATMENT (OUTPATIENT)
Dept: PHYSICAL THERAPY | Facility: CLINIC | Age: 70
End: 2025-01-29
Payer: MEDICARE

## 2025-01-29 DIAGNOSIS — M54.12 CERVICAL RADICULOPATHY: ICD-10-CM

## 2025-01-29 DIAGNOSIS — M54.50 LUMBAR PAIN: ICD-10-CM

## 2025-01-29 DIAGNOSIS — M54.10 BACK PAIN WITH RADICULOPATHY: ICD-10-CM

## 2025-01-29 PROCEDURE — 97110 THERAPEUTIC EXERCISES: CPT | Mod: GP

## 2025-01-29 PROCEDURE — 97140 MANUAL THERAPY 1/> REGIONS: CPT | Mod: GP

## 2025-01-29 NOTE — PROGRESS NOTES
Physical Therapy Treatment    Patient Name: Ida Wynne  MRN: [id number]  Today's Date: 1/29/2025  Time Calculation  Start Time: 1000  Stop Time: 1038  Time Calculation (min): 38 min     PT Therapeutic Procedures Time Entry  Manual Therapy Time Entry: 15  Therapeutic Exercise Time Entry: 23                 Insurance:   Visit 5/7 (4/6 approved) (1 approved eval)     [employer] MEDIC[employer] APPROVED 6 PT VISITS 1-6-25 THRU 2-21-25  AUTH# [id number]  [id number]/ALL  Assessment:  Pt tolerated treatment session well today. Able to add resistance to bridges for continued strengthening with appropriate challenge. Pt able to tolerate MWM for leg extension without any pain. Took out prone with leg extension d/t inc pain. Added supine marching with TA brace for continued strengthening and stabilization. Inc in muscle tone of L piriformis this visit. Pt had relief of pain by end of session today. Pt will continue to benefit from skilled therapy in order to improve functional mobility and reach her goals.    Plan:  Continue to progress as tolerated with focus on ROM, core strength/stability    Current Problem  1. Lumbar pain  Follow Up In Physical Therapy      2. Cervical radiculopathy  Follow Up In Physical Therapy      3. Back pain with radiculopathy  Follow Up In Physical Therapy            Subjective   General  Pt states she continues to have pain in the low back on the right side but now it has switched to the left. She states laying on her stomach and lifting her leg made things worse so she stopped doing it. She states her neck was doing really good until yesterday. She states she may have slept wrong which aggravated it. She still has radicular symptoms but that has lessened significantly since she started physical therapy. She states her pain in her back is rated 3/10 and none in her arms.  Precautions       Pain   3/10 low back  0/10 post session  Objective       Treatments:  Therapeutic Exercise (39708):   LS: PPT 2 x 10    Bridges x 15 GTB  HL clamshells green TB 2 x 10   LTR x 20ea  Supine march with TA brace 2x10 BL  SLR with TA brace 2 x 10 ea  Prone on elbows- 2 mins   BARBARA x10    CS: supine chin retractions 2 x 10 --  Seated scap retractions 2 x 10 --  Seated H. Abd x10 YTB     Manual ([id number]):  LS: Grade 1-3 PA, Uni (R) mobs lumbar spine  Sacral mobs  MWM- leg extension with lumbar mobilization   STM Piriformis L  CS:   Traction, traction with rotation  STM UT    EDUCATION:   Access Code: [email or web address]  URL: [email or web address]  Date: 01/29/2025  Prepared by: Mounika Chowdhury    Exercises  - Supine Bridge with Resistance Band  - 1 x daily - 3-4 x weekly - 1-2 sets - 10 reps  - Standing Shoulder Horizontal Abduction with Resistance  - 1 x daily - 3-4 x weekly - 1-2 sets - 10 reps

## 2025-02-05 ENCOUNTER — TREATMENT (OUTPATIENT)
Dept: PHYSICAL THERAPY | Facility: CLINIC | Age: 70
End: 2025-02-05
Payer: MEDICARE

## 2025-02-05 DIAGNOSIS — M54.12 CERVICAL RADICULOPATHY: ICD-10-CM

## 2025-02-05 DIAGNOSIS — M54.10 BACK PAIN WITH RADICULOPATHY: ICD-10-CM

## 2025-02-05 DIAGNOSIS — M54.50 LUMBAR PAIN: ICD-10-CM

## 2025-02-05 PROCEDURE — 97110 THERAPEUTIC EXERCISES: CPT | Mod: GP,CQ

## 2025-02-05 ASSESSMENT — PAIN SCALES - GENERAL: PAINLEVEL_OUTOF10: 4

## 2025-02-05 ASSESSMENT — PAIN - FUNCTIONAL ASSESSMENT: PAIN_FUNCTIONAL_ASSESSMENT: 0-10

## 2025-02-05 NOTE — PROGRESS NOTES
Physical Therapy Treatment    Patient Name: Ida Wynne  MRN: [id number]  Today's Date: 2/5/2025  Time Calculation  Start Time: 0915  Stop Time: 0956  Time Calculation (min): 41 min  PT Therapeutic Procedures Time Entry  Therapeutic Exercise Time Entry: 38       Current Problem  1. Lumbar pain  Follow Up In Physical Therapy      2. Cervical radiculopathy  Follow Up In Physical Therapy      3. Back pain with radiculopathy  Follow Up In Physical Therapy          Subjective   General   Pt stating a bit more pain today after a lot of house work yesterday. Pain in low back relatively unchanged in intensity past week or two without radicular symptoms.  Insurance   Visit 6/7 (5/6 approved) (1 approved eval)     [employer] MEDICARE APPROVED 6 PT VISITS 1-6-25 THRU 2-21-25  AUTH# [id number]  [id number]/ALL  Precautions     Pain  Pain Assessment: 0-10  0-10 (Numeric) Pain Score: 4  Pain Type: Chronic pain  Pain Location: Back  Pain Orientation: Lower    Objective   Treatments:  Supine cervical roll with unloaded retractions, 20 reps    SKTC, 10 reps, 5 sec holds  DKTC, 15 reps, 5 sec holds  LTR, 15 reps, 5 sec holds  Supine clamshells, GTB, 20 reps, 5 sec holds  STS with lordosis, 20 reps  Seated UTR, 20 reps  Cervical extension with towel, 20 reps  Cervical rotation Snags, 10 reps, 5 sec holds  Rows/LAE, RTB, 20 reps  B ER/h.abduction, RTB, 20 reps    Assessment   Able to begin some light flexion motion in lumbar spine with reduction tightness and no increase in aching discomfort or onset radicular symptoms. Introduced into HEP with emphasis return extension forces with change in symptoms. Still with resolution familiar UE radicular pain with cervical roll and unloaded retractions.   Plan:  Cont to progress strength and endurance     OP EDUCATION:       Goals:

## 2025-02-12 ENCOUNTER — TREATMENT (OUTPATIENT)
Dept: PHYSICAL THERAPY | Facility: CLINIC | Age: 70
End: 2025-02-12
Payer: MEDICARE

## 2025-02-12 DIAGNOSIS — M54.12 CERVICAL RADICULOPATHY: ICD-10-CM

## 2025-02-12 DIAGNOSIS — M54.50 LUMBAR PAIN: Primary | ICD-10-CM

## 2025-02-12 DIAGNOSIS — M54.10 BACK PAIN WITH RADICULOPATHY: ICD-10-CM

## 2025-02-12 PROCEDURE — 97110 THERAPEUTIC EXERCISES: CPT | Mod: GP

## 2025-02-12 PROCEDURE — 97140 MANUAL THERAPY 1/> REGIONS: CPT | Mod: GP

## 2025-02-12 NOTE — PROGRESS NOTES
Physical Therapy Treatment    Patient Name: Ida Wynne  MRN: [id number]  Today's Date: 2/12/2025  Time Calculation  Start Time: 1000  Stop Time: 1041  Time Calculation (min): 41 min  PT Therapeutic Procedures Time Entry  Manual Therapy Time Entry: 6  Therapeutic Exercise Time Entry: 35       Current Problem  1. Lumbar pain        2. Cervical radiculopathy        3. Back pain with radiculopathy              Subjective   General   Pt states she is in an increased amount of back pain today rated 5/10. She state her legs feel like they want to give out on her which hasn't happened in a while.   Insurance   Visit 7/7 (6/6 approved) (1 approved eval)     [employer] APPROVED 6 PT VISITS 1-6-25 THRU 2-21-25  AUTH# [id number]  [id number]/ALL  Precautions     Pain       Objective   AROM    Cervical flexion: WNL    Cervical extension: WNL    Cervical sidebending Right: limited 25%   Sidebending Left: limited 25%    Cervical rotation Right: limited 25%    Rotation Left: limited 25%     MMT    Shoulder deltoid flexion right: 5    Deltoid flexion left: 5    (C5) Shoulder deltoid abduction right: 5   Deltoid abduction left: 5    Shoulder ER @ 0 degrees abduction right: 5    ER @ 0 degrees abduction left: 5    Shoulder IR @ 0 degrees abduction right: 5    IR @ 0 degrees abduction left: 5    (C6) Biceps brachii right: 5    Biceps brachii left: 5    (C7) Triceps brachii right: 5    Triceps brachii left: 5    (C7) Wrist flexors right: 5      Wrist flexors left: 5      (C6) Wrist extensors right: 5     Wrist extensors left: 5      (C8) Thumb extension right: 4+      Thumb extension left: 4+      (T1) Finger abduction right: 4+      Finger abduction left: 4+        AROM    Lumbar flexion: Dec 25 P!      Lumbar extension: limited 25%      Lumbar sidebending right:WNL    Sidebending left: limited 25%      Lumbar rotation right: limited 25%     Rotation left: limited 25%         MMT:    (L3-L4) Right quadriceps: 4+      Left quadriceps: 5      (L1-L2) Right iliopsoas: 5      Left iliopsoas: 4      (S2) Right hip abductors supine: 4+     Left hip abductors supine: 4+      (L1-L2) Right hip adductors supine: 4+     Left hip adductors supine: 4+      (L5-S1) Right gluteus daylin: 4     Left gluteus daylin: 4      (S1-S2) Right hamstrings: 5      Left hamstrings: 5      Lower abdominals: fair      Upper abdominals: fair       Flexibility:    Right iliopsoas: limited     Left iliopsoas: limited      Right rectus femoris:  limited    Left rectus femoris: limited      Right hamstring: limited     Left hamstring: limited      Right piriformis: limited      Left piriformis: limited      Right quadriceps: limited     Left quadriceps: limited        Outcome Measures:  Other Measures  Oswestry Disablity Index (MIKEY): 14%   NDI 16%  Treatments:  Objective measurements taken/goal review  SKTC, 10 reps, 5 sec holds  DKTC with PB x20 reps, 5 sec holds  LTR, 15 reps, 5 sec holds  Supine clamshells, BTB, 20 reps, 5 sec holds  Reverse crunches 2x10  Bridges x10  STS with lordosis, 20 reps     Manual ([id number]):  LS: Grade 1-3 PA, Uni (R) mobs lumbar spine  Sacral mobs  STM Piriformis BL    Assessment   Recheck performed this visit with pt demonstrating improvements in pain, strength, and ROM. Pt has little to no pain or radicular symptoms in neck or BL UE at this time. Pt continues to have pain and mobility defecits in lumbar spine but this has improved. Able to add several new strengthening exercises for hip and core this date with good tolerance. Pt without any radicular symptoms at this time. Improvement in pain noted at end of session with pain decreased to 3/10 from 5/10 at beginning of session. Pt will continue to benefit from skilled therapy in order to improve pain, strength, and ROM.  Plan:  Continue to progress as tolerated with focus on core and hip strengthening  Therapist recommending 1x/week 4 weeks    OP EDUCATION:   Access  Code: [email or web address]  URL: [email or web address]  Date: 02/12/2025  Prepared by: Mounika Chowdhury    Exercises  - Reverse Crunch with Knees Bent  - 1 x daily - 3-4 x weekly - 1-2 sets - 10 reps  - Supine Piriformis Stretch with Foot on Ground  - 1 x daily - 7 x weekly - 2 sets - 30s hold    Goals:

## 2025-02-19 ENCOUNTER — TREATMENT (OUTPATIENT)
Dept: PHYSICAL THERAPY | Facility: CLINIC | Age: 70
End: 2025-02-19
Payer: MEDICARE

## 2025-02-19 DIAGNOSIS — M54.50 LUMBAR PAIN: ICD-10-CM

## 2025-02-19 DIAGNOSIS — M54.10 BACK PAIN WITH RADICULOPATHY: ICD-10-CM

## 2025-02-19 DIAGNOSIS — M54.12 CERVICAL RADICULOPATHY: ICD-10-CM

## 2025-02-19 PROCEDURE — 97140 MANUAL THERAPY 1/> REGIONS: CPT | Mod: GP,CQ

## 2025-02-19 PROCEDURE — 97110 THERAPEUTIC EXERCISES: CPT | Mod: GP,CQ

## 2025-02-19 NOTE — PROGRESS NOTES
Physical Therapy Treatment    Patient Name: Ida Wynne  MRN: [id number]  Today's Date: 2/19/2025  Time Calculation  Start Time: 0750  Stop Time: 0828  Time Calculation (min): 38 min  PT Therapeutic Procedures Time Entry  Manual Therapy Time Entry: 10  Therapeutic Exercise Time Entry: 28       Insurance   Visit 8/11 (6/6 approved) (1 approved eval)     [employer] MED[employer] APPROVED 4 PT VISITS 2-17-25 THRU 3-14-25  AUTH# [id number]  [id number]/ALL    Current Problem  1. Lumbar pain  Follow Up In Physical Therapy      2. Cervical radiculopathy  Follow Up In Physical Therapy      3. Back pain with radiculopathy  Follow Up In Physical Therapy          Subjective   General   Pt. Reports she has increased pain coming in this morning.   Precautions     Pain       Objective   Treatments:   Prone lying  ANN MARIE  Prone press ups 2x10  Prone SLRs 2x10  LTR, 15 reps, 5 sec holds  Supine clamshells, BTB, 20 reps, 3 sec holds  Reverse crunches 2x10  Bridges P!  STS with lordosis, 20 reps      Manual ([id number]):  LS: Grade 1-3 PA, Uni (R) mobs lumbar spine  Sacral mobs  STM Piriformis BL    Assessment:   Pt. Progressing w/ all exercises well. Pt. Elberta the best w/ prone exercises today and was able to get close to abolishing her symptoms. Added prone SLRs for increasing LE strength w/ good pt. Tolerance. Pt. Could not perform Bridges today d/t increased pain while performing and will try them again NV. Performed PA mobs for increasing lumbar spine mobility in order to decrease symptoms and pt. Felt better after. Pt. Educated to perform prone exercises every 2hrs.     Plan:   Continue to increase strength/mobility.     OP EDUCATION:       Goals:

## 2025-02-27 ENCOUNTER — TREATMENT (OUTPATIENT)
Dept: PHYSICAL THERAPY | Facility: CLINIC | Age: 70
End: 2025-02-27
Payer: MEDICARE

## 2025-02-27 DIAGNOSIS — M54.12 CERVICAL RADICULOPATHY: ICD-10-CM

## 2025-02-27 DIAGNOSIS — M54.50 LUMBAR PAIN: ICD-10-CM

## 2025-02-27 DIAGNOSIS — M54.10 BACK PAIN WITH RADICULOPATHY: ICD-10-CM

## 2025-02-27 PROCEDURE — 97140 MANUAL THERAPY 1/> REGIONS: CPT | Mod: GP,CQ

## 2025-02-27 PROCEDURE — 97110 THERAPEUTIC EXERCISES: CPT | Mod: GP,CQ

## 2025-02-27 ASSESSMENT — PAIN - FUNCTIONAL ASSESSMENT: PAIN_FUNCTIONAL_ASSESSMENT: 0-10

## 2025-02-27 ASSESSMENT — PAIN SCALES - GENERAL: PAINLEVEL_OUTOF10: 4

## 2025-02-27 NOTE — PROGRESS NOTES
Physical Therapy Treatment    Patient Name: Ida Wynne  MRN: [id number]  Today's Date: 2/27/2025  Time in 0829  Time out 0914  Treatment time 45 min  Therapeutic Exercise 30 min  Manual treatment 15 min             Insurance   Visit 9/11 (6/6 approved) (1 approved eval)     [employer] APPROVED 4 PT VISITS 2-17-25 THRU 3-14-25  AUTH# [id number]  [id number]/ALL    Current Problem  1. Lumbar pain  Follow Up In Physical Therapy      2. Cervical radiculopathy  Follow Up In Physical Therapy      3. Back pain with radiculopathy  Follow Up In Physical Therapy          Subjective   General  Reports continued pain, but she is able to centralize symptoms during the day.    Precautions     Pain  Pain Assessment: 0-10  0-10 (Numeric) Pain Score: 4  Pain Type: Chronic pain  Pain Location: Back  Pain Orientation: Lower    Objective   Treatments:  Prone lying  ANN MARIE  Prone press ups 2x10  Prone SLRs 2x10  LTR, 15 reps, 5 sec holds  Supine clamshells, BTB, 20 reps, 3 sec holds  Reverse crunches 2x10  Bridges x 10  STS with lordosis, 20 reps      Manual ([id number]):  LS: Grade 1-3 PA, Uni (R) mobs lumbar spine  Sacral mobs  STM Piriformis BL    Assessment:  Patient able to tolerate treatment with no increase in symptoms. Able to centralize symptoms with prone activities. Able to perform all exercise with no increase in pain.  Performed PA mobs for increasing lumbar spine mobility with decreased resting pain Encouraged patient to continue to perform extensor activities frequently. Will continue to benefit from focus on core stability.    Plan:  Continue with core stability and mobility as tolerated    OP EDUCATION:       Goals:

## 2025-03-03 ENCOUNTER — HOSPITAL ENCOUNTER (EMERGENCY)
Facility: HOSPITAL | Age: 70
Discharge: HOME | End: 2025-03-03
Attending: EMERGENCY MEDICINE
Payer: MEDICARE

## 2025-03-03 ENCOUNTER — APPOINTMENT (OUTPATIENT)
Dept: RADIOLOGY | Facility: HOSPITAL | Age: 70
End: 2025-03-03
Payer: MEDICARE

## 2025-03-03 ENCOUNTER — APPOINTMENT (OUTPATIENT)
Dept: CARDIOLOGY | Facility: HOSPITAL | Age: 70
End: 2025-03-03
Payer: MEDICARE

## 2025-03-03 VITALS
HEART RATE: 68 BPM | HEIGHT: 62 IN | BODY MASS INDEX: 34.96 KG/M2 | SYSTOLIC BLOOD PRESSURE: 169 MMHG | RESPIRATION RATE: 20 BRPM | TEMPERATURE: 96.4 F | OXYGEN SATURATION: 98 % | WEIGHT: 190 LBS | DIASTOLIC BLOOD PRESSURE: 77 MMHG

## 2025-03-03 DIAGNOSIS — D64.9 ANEMIA, UNSPECIFIED TYPE: ICD-10-CM

## 2025-03-03 DIAGNOSIS — U07.1 COVID-19: Primary | ICD-10-CM

## 2025-03-03 DIAGNOSIS — I10 HYPERTENSION, UNSPECIFIED TYPE: ICD-10-CM

## 2025-03-03 LAB
ALBUMIN SERPL BCP-MCNC: 4.4 G/DL (ref 3.4–5)
ALP SERPL-CCNC: 63 U/L (ref 33–136)
ALT SERPL W P-5'-P-CCNC: 21 U/L (ref 7–45)
ANION GAP SERPL CALC-SCNC: 12 MMOL/L (ref 10–20)
APPEARANCE UR: CLEAR
AST SERPL W P-5'-P-CCNC: 24 U/L (ref 9–39)
BASOPHILS # BLD AUTO: 0.02 X10*3/UL (ref 0–0.1)
BASOPHILS NFR BLD AUTO: 0.4 %
BILIRUB SERPL-MCNC: 0.8 MG/DL (ref 0–1.2)
BILIRUB UR STRIP.AUTO-MCNC: NEGATIVE MG/DL
BNP SERPL-MCNC: 29 PG/ML (ref 0–99)
BUN SERPL-MCNC: 13 MG/DL (ref 6–23)
CALCIUM SERPL-MCNC: 9.6 MG/DL (ref 8.6–10.3)
CARDIAC TROPONIN I PNL SERPL HS: <3 NG/L (ref 0–13)
CARDIAC TROPONIN I PNL SERPL HS: <3 NG/L (ref 0–13)
CHLORIDE SERPL-SCNC: 104 MMOL/L (ref 98–107)
CO2 SERPL-SCNC: 26 MMOL/L (ref 21–32)
COLOR UR: COLORLESS
CREAT SERPL-MCNC: 0.92 MG/DL (ref 0.5–1.05)
EGFRCR SERPLBLD CKD-EPI 2021: 67 ML/MIN/1.73M*2
EOSINOPHIL # BLD AUTO: 0.11 X10*3/UL (ref 0–0.7)
EOSINOPHIL NFR BLD AUTO: 1.9 %
ERYTHROCYTE [DISTWIDTH] IN BLOOD BY AUTOMATED COUNT: 14.4 % (ref 11.5–14.5)
FLUAV RNA RESP QL NAA+PROBE: NOT DETECTED
FLUBV RNA RESP QL NAA+PROBE: NOT DETECTED
GLUCOSE SERPL-MCNC: 107 MG/DL (ref 74–99)
GLUCOSE UR STRIP.AUTO-MCNC: NORMAL MG/DL
HCT VFR BLD AUTO: 35.7 % (ref 36–46)
HGB BLD-MCNC: 11.1 G/DL (ref 12–16)
HOLD SPECIMEN: NORMAL
IMM GRANULOCYTES # BLD AUTO: 0.02 X10*3/UL (ref 0–0.7)
IMM GRANULOCYTES NFR BLD AUTO: 0.4 % (ref 0–0.9)
INR PPP: 1 (ref 0.9–1.1)
KETONES UR STRIP.AUTO-MCNC: NEGATIVE MG/DL
LACTATE SERPL-SCNC: 0.7 MMOL/L (ref 0.4–2)
LEUKOCYTE ESTERASE UR QL STRIP.AUTO: NEGATIVE
LYMPHOCYTES # BLD AUTO: 2.06 X10*3/UL (ref 1.2–4.8)
LYMPHOCYTES NFR BLD AUTO: 36.4 %
MAGNESIUM SERPL-MCNC: 1.82 MG/DL (ref 1.6–2.4)
MCH RBC QN AUTO: 23.9 PG (ref 26–34)
MCHC RBC AUTO-ENTMCNC: 31.1 G/DL (ref 32–36)
MCV RBC AUTO: 77 FL (ref 80–100)
MONOCYTES # BLD AUTO: 0.71 X10*3/UL (ref 0.1–1)
MONOCYTES NFR BLD AUTO: 12.5 %
NEUTROPHILS # BLD AUTO: 2.74 X10*3/UL (ref 1.2–7.7)
NEUTROPHILS NFR BLD AUTO: 48.4 %
NITRITE UR QL STRIP.AUTO: NEGATIVE
NRBC BLD-RTO: 0 /100 WBCS (ref 0–0)
PH UR STRIP.AUTO: 5.5 [PH]
PLATELET # BLD AUTO: 166 X10*3/UL (ref 150–450)
POTASSIUM SERPL-SCNC: 4 MMOL/L (ref 3.5–5.3)
PROT SERPL-MCNC: 8.1 G/DL (ref 6.4–8.2)
PROT UR STRIP.AUTO-MCNC: NEGATIVE MG/DL
PROTHROMBIN TIME: 11.1 SECONDS (ref 9.8–12.4)
RBC # BLD AUTO: 4.65 X10*6/UL (ref 4–5.2)
RBC # UR STRIP.AUTO: NEGATIVE MG/DL
RSV RNA RESP QL NAA+PROBE: NOT DETECTED
S PYO DNA THROAT QL NAA+PROBE: NOT DETECTED
SARS-COV-2 RNA RESP QL NAA+PROBE: DETECTED
SODIUM SERPL-SCNC: 138 MMOL/L (ref 136–145)
SP GR UR STRIP.AUTO: 1.01
UROBILINOGEN UR STRIP.AUTO-MCNC: NORMAL MG/DL
WBC # BLD AUTO: 5.7 X10*3/UL (ref 4.4–11.3)

## 2025-03-03 PROCEDURE — 81003 URINALYSIS AUTO W/O SCOPE: CPT | Performed by: EMERGENCY MEDICINE

## 2025-03-03 PROCEDURE — 71045 X-RAY EXAM CHEST 1 VIEW: CPT | Performed by: INTERNAL MEDICINE

## 2025-03-03 PROCEDURE — 36415 COLL VENOUS BLD VENIPUNCTURE: CPT | Performed by: EMERGENCY MEDICINE

## 2025-03-03 PROCEDURE — 93005 ELECTROCARDIOGRAM TRACING: CPT

## 2025-03-03 PROCEDURE — 87637 SARSCOV2&INF A&B&RSV AMP PRB: CPT | Performed by: EMERGENCY MEDICINE

## 2025-03-03 PROCEDURE — 87651 STREP A DNA AMP PROBE: CPT | Performed by: EMERGENCY MEDICINE

## 2025-03-03 PROCEDURE — 85025 COMPLETE CBC W/AUTO DIFF WBC: CPT | Performed by: EMERGENCY MEDICINE

## 2025-03-03 PROCEDURE — 80053 COMPREHEN METABOLIC PANEL: CPT | Performed by: EMERGENCY MEDICINE

## 2025-03-03 PROCEDURE — 85610 PROTHROMBIN TIME: CPT | Performed by: EMERGENCY MEDICINE

## 2025-03-03 PROCEDURE — 83880 ASSAY OF NATRIURETIC PEPTIDE: CPT | Performed by: EMERGENCY MEDICINE

## 2025-03-03 PROCEDURE — 84484 ASSAY OF TROPONIN QUANT: CPT | Performed by: EMERGENCY MEDICINE

## 2025-03-03 PROCEDURE — 99285 EMERGENCY DEPT VISIT HI MDM: CPT | Performed by: EMERGENCY MEDICINE

## 2025-03-03 PROCEDURE — 96361 HYDRATE IV INFUSION ADD-ON: CPT

## 2025-03-03 PROCEDURE — 2500000004 HC RX 250 GENERAL PHARMACY W/ HCPCS (ALT 636 FOR OP/ED): Performed by: EMERGENCY MEDICINE

## 2025-03-03 PROCEDURE — 83735 ASSAY OF MAGNESIUM: CPT | Performed by: EMERGENCY MEDICINE

## 2025-03-03 PROCEDURE — 96374 THER/PROPH/DIAG INJ IV PUSH: CPT

## 2025-03-03 PROCEDURE — 83605 ASSAY OF LACTIC ACID: CPT | Performed by: EMERGENCY MEDICINE

## 2025-03-03 PROCEDURE — 71045 X-RAY EXAM CHEST 1 VIEW: CPT

## 2025-03-03 PROCEDURE — 2500000001 HC RX 250 WO HCPCS SELF ADMINISTERED DRUGS (ALT 637 FOR MEDICARE OP): Performed by: EMERGENCY MEDICINE

## 2025-03-03 RX ORDER — ACETAMINOPHEN 325 MG/1
975 TABLET ORAL ONCE
Status: COMPLETED | OUTPATIENT
Start: 2025-03-03 | End: 2025-03-03

## 2025-03-03 RX ORDER — KETOROLAC TROMETHAMINE 30 MG/ML
15 INJECTION, SOLUTION INTRAMUSCULAR; INTRAVENOUS ONCE
Status: COMPLETED | OUTPATIENT
Start: 2025-03-03 | End: 2025-03-03

## 2025-03-03 RX ORDER — HYDROCODONE BITARTRATE AND HOMATROPINE METHYLBROMIDE 1.5; 5 MG/5ML; MG/5ML
5 SYRUP ORAL EVERY 6 HOURS PRN
Qty: 60 ML | Refills: 0 | Status: SHIPPED | OUTPATIENT
Start: 2025-03-03 | End: 2025-03-06

## 2025-03-03 RX ORDER — LABETALOL HYDROCHLORIDE 5 MG/ML
20 INJECTION, SOLUTION INTRAVENOUS ONCE
Status: DISCONTINUED | OUTPATIENT
Start: 2025-03-03 | End: 2025-03-03 | Stop reason: HOSPADM

## 2025-03-03 RX ORDER — ALBUTEROL SULFATE 90 UG/1
1-2 INHALANT RESPIRATORY (INHALATION) EVERY 6 HOURS PRN
Qty: 1 G | Refills: 0 | Status: SHIPPED | OUTPATIENT
Start: 2025-03-03 | End: 2025-04-02

## 2025-03-03 RX ADMIN — SODIUM CHLORIDE 500 ML: 9 INJECTION, SOLUTION INTRAVENOUS at 09:48

## 2025-03-03 RX ADMIN — KETOROLAC TROMETHAMINE 15 MG: 30 INJECTION, SOLUTION INTRAMUSCULAR at 09:44

## 2025-03-03 RX ADMIN — ACETAMINOPHEN 975 MG: 325 TABLET ORAL at 09:46

## 2025-03-03 ASSESSMENT — COLUMBIA-SUICIDE SEVERITY RATING SCALE - C-SSRS
1. IN THE PAST MONTH, HAVE YOU WISHED YOU WERE DEAD OR WISHED YOU COULD GO TO SLEEP AND NOT WAKE UP?: NO
6. HAVE YOU EVER DONE ANYTHING, STARTED TO DO ANYTHING, OR PREPARED TO DO ANYTHING TO END YOUR LIFE?: NO
2. HAVE YOU ACTUALLY HAD ANY THOUGHTS OF KILLING YOURSELF?: NO

## 2025-03-03 ASSESSMENT — PAIN DESCRIPTION - LOCATION: LOCATION: HEAD

## 2025-03-03 ASSESSMENT — LIFESTYLE VARIABLES
TOTAL SCORE: 0
EVER HAD A DRINK FIRST THING IN THE MORNING TO STEADY YOUR NERVES TO GET RID OF A HANGOVER: NO
EVER FELT BAD OR GUILTY ABOUT YOUR DRINKING: NO
HAVE YOU EVER FELT YOU SHOULD CUT DOWN ON YOUR DRINKING: NO
HAVE PEOPLE ANNOYED YOU BY CRITICIZING YOUR DRINKING: NO

## 2025-03-03 ASSESSMENT — PAIN SCALES - GENERAL
PAINLEVEL_OUTOF10: 0 - NO PAIN
PAINLEVEL_OUTOF10: 3

## 2025-03-03 ASSESSMENT — PAIN - FUNCTIONAL ASSESSMENT: PAIN_FUNCTIONAL_ASSESSMENT: 0-10

## 2025-03-03 NOTE — ED PROVIDER NOTES
"HPI   Chief Complaint   Patient presents with    Flu Symptoms     \"I have flu like and covid like symptoms bad shortness of breath.  Cough congestion runny nose sore throat body aches headache nausea.\"         History provided by:  Patient and spouse    Chief Complaint   Patient presents with    Flu Symptoms     \"I have flu like and covid like symptoms bad shortness of breath.  Cough congestion runny nose sore throat body aches headache nausea.\"       History of Present Illness:  Ida Wynne is a 70 y.o. female presents with cough, runny nose, body aches and shortness of breath since last night.  She also has a mild headache with nausea.  No fever.  No chest pain.  No back or flank pain.  No leg swelling.  No sick contacts.  No trauma or travel.  The patient has been trying to treat herself with Cepacol and Mucinex.      PMFSH:   As per HPI, otherwise nurses notes reviewed in EMR.    Past Medical History:   Past Medical History:   Diagnosis Date    COPD (chronic obstructive pulmonary disease) (Multi)     Hypertension       Past Surgical History:   Past Surgical History:   Procedure Laterality Date    US ASPIRATION INJECTION INTERMEDIATE JOINT  1/10/2020    US ASPIRATION INJECTION INTERMEDIATE JOINT 1/10/2020 [facility]      Family History: No family history on file.   Social History:    Social History     Tobacco Use    Smoking status: Never    Smokeless tobacco: Never   Vaping Use    Vaping status: Never Used   Substance Use Topics    Alcohol use: Never    Drug use: Never     Allergies:   Allergies   Allergen Reactions    Psyllium Unknown and Shortness of breath    Morphine Itching and Unknown     Current Outpatient Medications   Medication Instructions    albuterol 90 mcg/actuation inhaler 2 puffs, inhalation, Every 4 hours PRN    albuterol 90 mcg/actuation inhaler 1-2 puffs, inhalation, Every 6 hours PRN    hydrocodone-homatropine 5-1.5 mg/5 mL syrup 5 mL, oral, Every 6 hours PRN    " "nirmatrelvir-ritonavir (Paxlovid) 300 mg (150 mg x 2)-100 mg tablet therapy pack 2 tablets, oral, 2 times daily, Follow the instructions on the package              Patient History   Past Medical History:   Diagnosis Date    COPD (chronic obstructive pulmonary disease) (Multi)     Hypertension      Past Surgical History:   Procedure Laterality Date    US ASPIRATION INJECTION INTERMEDIATE JOINT  1/10/2020    US ASPIRATION INJECTION INTERMEDIATE JOINT 1/10/2020 [facility]     No family history on file.  Social History     Tobacco Use    Smoking status: Never    Smokeless tobacco: Never   Vaping Use    Vaping status: Never Used   Substance Use Topics    Alcohol use: Never    Drug use: Never       Physical Exam   ED Triage Vitals [03/03/25 0907]   Temperature Heart Rate Respirations BP   35.8 °C (96.4 °F) 97 20 (!) 211/112      Pulse Ox Temp Source Heart Rate Source Patient Position   98 % Temporal Monitor Sitting      BP Location FiO2 (%)     Right arm --       Physical Exam  Physical Exam:    ED Triage Vitals [03/03/25 0907]   Temperature Heart Rate Respirations BP   35.8 °C (96.4 °F) 97 20 (!) 211/112      Pulse Ox Temp Source Heart Rate Source Patient Position   98 % Temporal Monitor Sitting      BP Location FiO2 (%)     Right arm --         Patient Vitals for the past 24 hrs:   BP Temp Temp src Pulse Resp SpO2 Height Weight   03/03/25 1200 -- -- -- 72 -- 99 % -- --   03/03/25 1120 168/80 -- -- 68 -- 97 % -- --   03/03/25 1050 146/73 -- -- 68 -- 97 % -- --   03/03/25 1020 178/80 -- -- 76 -- 99 % -- --   03/03/25 1000 -- -- -- 76 -- 98 % -- --   03/03/25 0945 175/84 -- -- 78 -- -- -- --   03/03/25 0907 (!) 211/112 35.8 °C (96.4 °F) Temporal 97 20 98 % 1.575 m (5' 2\") 86.2 kg (190 lb)       Constitutional: Vital signs per nursing notes.  Well developed, well nourished.  Mild acute distress.    Psychiatric: alert and oriented to person, place, and time; no abnormalities of mood or affect; memory intact  Eyes: " PERRL; conjunctivae and lids normal; EOMI  ENT: otoscopic exam of external canal and TM´s normal; nasal mucosa, turbinates, and septum normal; mouth, tongue, and pharynx normal; pharynx without edema, exudate, or injection; except clear rhinorrhea  Respiratory: normal respiratory effort and excursion; no rales, rhonchi, or wheezes; equal air entry  Cardiovascular: regular rate and rhythm; no murmurs, rubs or gallops; symmetric pulses; no edema; normal capillary refill; distal pulses present  Neurological: normal speech; CN II-XII grossly intact; normal motor and sensory function; no nystagmus  GI: no masses, tenderness, rebound or guarding; no palpable, pulsatile mass; no organomegaly; no hernia; normal bowel sounds; (-) Andrade´s sign; (-) McBurney´s sign; (-) CVA tenderness  Lymphatic: no adenopathy of neck  Musculoskeletal: normal gait and station; normal digits and nails; no gross tendon or ligament injury; normal to palpation; normal strength/tone; neurovascular status intact; (-) Charanjit´s sign  Skin: normal to inspection; normal to palpation; no rash  GCS: 15      ED Course & MDM   Diagnoses as of 03/03/25 1310   COVID-19   Hypertension, unspecified type   Anemia, unspecified type                 No data recorded                                 Medical Decision Making  Medical Decision Making:    EKG: My interpretation of EKG is normal sinus rhythm at 84 bpm with no acute ST-T changes             My interpretation second EKG is normal sinus rhythm at 6 7 bpm with no acute ST-T changes.    Labs:   Labs Reviewed   CBC WITH AUTO DIFFERENTIAL - Abnormal       Result Value    WBC 5.7      nRBC 0.0      RBC 4.65      Hemoglobin 11.1 (*)     Hematocrit 35.7 (*)     MCV 77 (*)     MCH 23.9 (*)     MCHC 31.1 (*)     RDW 14.4      Platelets 166      Neutrophils % 48.4      Immature Granulocytes %, Automated 0.4      Lymphocytes % 36.4      Monocytes % 12.5      Eosinophils % 1.9      Basophils % 0.4      Neutrophils  Absolute 2.74      Immature Granulocytes Absolute, Automated 0.02      Lymphocytes Absolute 2.06      Monocytes Absolute 0.71      Eosinophils Absolute 0.11      Basophils Absolute 0.02     COMPREHENSIVE METABOLIC PANEL - Abnormal    Glucose 107 (*)     Sodium 138      Potassium 4.0      Chloride 104      Bicarbonate 26      Anion Gap 12      Urea Nitrogen 13      Creatinine 0.92      eGFR 67      Calcium 9.6      Albumin 4.4      Alkaline Phosphatase 63      Total Protein 8.1      AST 24      Bilirubin, Total 0.8      ALT 21     SARS-COV-2 AND INFLUENZA A/B PCR - Abnormal    Flu A Result Not Detected      Flu B Result Not Detected      Coronavirus 2019, PCR Detected (*)     Narrative:     This assay is an FDA-cleared, in vitro diagnostic nucleic acid amplification test for the qualitative detection and differentiation of SARS CoV-2/ Influenza A/B from nasopharyngeal specimens collected from individuals with signs and symptoms of respiratory tract infections, and has been validated for use at [facility] [place]. Negative results do not preclude COVID-19/ Influenza A/B infections and should not be used as the sole basis for diagnosis, treatment, or other management decisions. Testing for SARS CoV-2 is recommended only for patients who meet current clinical and/or epidemiological criteria defined by federal, state, or local public health directives.   URINALYSIS WITH REFLEX CULTURE AND MICROSCOPIC - Abnormal    Color, Urine Colorless (*)     Appearance, Urine Clear      Specific Gravity, Urine 1.009      pH, Urine 5.5      Protein, Urine NEGATIVE      Glucose, Urine Normal      Blood, Urine NEGATIVE      Ketones, Urine NEGATIVE      Bilirubin, Urine NEGATIVE      Urobilinogen, Urine Normal      Nitrite, Urine NEGATIVE      Leukocyte Esterase, Urine NEGATIVE     GROUP A STREPTOCOCCUS, PCR - Normal    Group A Strep PCR Not Detected     MAGNESIUM - Normal    Magnesium 1.82     B-TYPE NATRIURETIC PEPTIDE -  Normal    BNP 29      Narrative:        <100 pg/mL - Heart failure unlikely  100-299 pg/mL - Intermediate probability of acute heart                  failure exacerbation. Correlate with clinical                  context and patient history.    >=300 pg/mL - Heart Failure likely. Correlate with clinical                  context and patient history.    BNP testing is performed using different testing methodology at [facility] than at [facility]. Direct result comparisons should only be made within the same method.      LACTATE - Normal    Lactate 0.7      Narrative:     Venipuncture immediately after or during the administration of Metamizole may lead to falsely low results. Testing should be performed immediately prior to Metamizole dosing.   PROTIME-INR - Normal    Protime 11.1      INR 1.0     SERIAL TROPONIN-INITIAL - Normal    Troponin I, High Sensitivity <3      Narrative:     Less than 99th percentile of normal range cutoff-  Female and children under 18 years old <14 ng/L; Male <21 ng/L: Negative  Repeat testing should be performed if clinically indicated.     Female and children under 18 years old 14-50 ng/L; Male 21-50 ng/L:  Consistent with possible cardiac damage and possible increased clinical   risk. Serial measurements may help to assess extent of myocardial damage.     >50 ng/L: Consistent with cardiac damage, increased clinical risk and  myocardial infarction. Serial measurements may help assess extent of   myocardial damage.      NOTE: Children less than 1 year old may have higher baseline troponin   levels and results should be interpreted in conjunction with the overall   clinical context.     NOTE: Troponin I testing is performed using a different   testing methodology at [facility] than at other   [facility]. Direct result comparisons should only   be made within the same method.   RSV PCR - Normal    RSV PCR Not Detected      Narrative:     This  assay is an FDA-cleared, in vitro diagnostic nucleic acid amplification test for the detection of RSV from nasopharyngeal specimens, and has been validated for use at [facility]. Negative results do not preclude RSV infections, and should not be used as the sole basis for diagnosis, treatment, or other management decisions. If Influenza A/B and RSV PCR results are negative, testing for Parainfluenza virus, Adenovirus and Metapneumovirus is routinely performed for [place]ology and [place] [facility] [place] and is available on other patients by placing an add-on request.       SERIAL TROPONIN, 1 HOUR - Normal    Troponin I, High Sensitivity <3      Narrative:     Less than 99th percentile of normal range cutoff-  Female and children under 18 years old <14 ng/L; Male <21 ng/L: Negative  Repeat testing should be performed if clinically indicated.     Female and children under 18 years old 14-50 ng/L; Male 21-50 ng/L:  Consistent with possible cardiac damage and possible increased clinical   risk. Serial measurements may help to assess extent of myocardial damage.     >50 ng/L: Consistent with cardiac damage, increased clinical risk and  myocardial infarction. Serial measurements may help assess extent of   myocardial damage.      NOTE: Children less than 1 year old may have higher baseline troponin   levels and results should be interpreted in conjunction with the overall   clinical context.     NOTE: Troponin I testing is performed using a different   testing methodology at [facility] than at other   [facility]. Direct result comparisons should only   be made within the same method.   TROPONIN SERIES- (INITIAL, 1 HR)    Narrative:     The following orders were created for panel order Troponin I Series, High Sensitivity (0, 1 HR).  Procedure                               Abnormality         Status                     ---------                                -----------         ------                     Troponin I, High Sensiti...[[id number]]  Normal              Final result               Troponin, High Sensitivi...[[id number]]  Normal              Final result                 Please view results for these tests on the individual orders.   URINALYSIS WITH REFLEX CULTURE AND MICROSCOPIC    Narrative:     The following orders were created for panel order Urinalysis with Reflex Culture and Microscopic.  Procedure                               Abnormality         Status                     ---------                               -----------         ------                     Urinalysis with Reflex C...[[id number]]  Abnormal            Final result               Extra Urine Gray Tube[[id number]]                            In process                   Please view results for these tests on the individual orders.   EXTRA URINE GRAY TUBE       Diagnostic Imaging:   XR chest 1 view   Final Result   Unchanged cardiomegaly/pericardial effusion. No evidence of acute   cardiopulmonary process.        Signed by: Marva Hines 3/3/2025 10:47 AM   Dictation workstation:   [id number]          ED Medication Administration:   Medications   labetaloL (Normodyne,Trandate) injection 20 mg (has no administration in time range)   sodium chloride 0.9 % bolus 500 mL (0 mL intravenous Stopped 3/3/25 1242)   ketorolac (Toradol) injection 15 mg (15 mg intravenous Given 3/3/25 0944)   acetaminophen (Tylenol) tablet 975 mg (975 mg oral Given 3/3/25 0946)       ED Course:     Ida Wynne is a 70 y.o. female presents with cough, congestion and shortness of breath.    Differential Diagnoses Considered:  Pneumonia, viral syndrome, ACS, arrhythmia      Diagnoses as of 03/03/25 1310   COVID-19   Hypertension, unspecified type   Anemia, unspecified type       Abnormal Labs Reviewed   CBC WITH AUTO DIFFERENTIAL - Abnormal; Notable for the following components:       Result Value    Hemoglobin 11.1 (*)      Hematocrit 35.7 (*)     MCV 77 (*)     MCH 23.9 (*)     MCHC 31.1 (*)     All other components within normal limits   COMPREHENSIVE METABOLIC PANEL - Abnormal; Notable for the following components:    Glucose 107 (*)     All other components within normal limits   SARS-COV-2 AND INFLUENZA A/B PCR - Abnormal; Notable for the following components:    Coronavirus 2019, PCR Detected (*)     All other components within normal limits    Narrative:     This assay is an FDA-cleared, in vitro diagnostic nucleic acid amplification test for the qualitative detection and differentiation of SARS CoV-2/ Influenza A/B from nasopharyngeal specimens collected from individuals with signs and symptoms of respiratory tract infections, and has been validated for use at [facility]. Negative results do not preclude COVID-19/ Influenza A/B infections and should not be used as the sole basis for diagnosis, treatment, or other management decisions. Testing for SARS CoV-2 is recommended only for patients who meet current clinical and/or epidemiological criteria defined by federal, state, or local public health directives.   URINALYSIS WITH REFLEX CULTURE AND MICROSCOPIC - Abnormal; Notable for the following components:    Color, Urine Colorless (*)     All other components within normal limits       BP      Temp      Pulse     Resp      SpO2        Patient presents with shortness of breath.    Chest x-ray to evaluate for evidence of pneumonia/pneumothorax/CHF/COPD.     EKG/troponin to evaluate for evidence of arrhythmia/ACS.    Lab work to evaluate for evidence of anemia or significant electrolyte abnormality including hypokalemia, hyperkalemia, hyponatremia, hypernatremia, hyperglycemia or hypoglycemia.     RSV/Influenza/COVID-19 swabs to evaluate for evidence of respective infections.    Lactic acid to evaluate for possibility of sepsis.    Considered CT chest, but no CT chest indicated as I considered but do not  suspect aortic dissection/pulmonary embolus.      Diagnostic evaluation was completed.  Urinalysis was negative.  Influenza was negative.  COVID is positive.  RSV is negative.  BNP is in the normal range so do not suspect CHF.  Strep is negative.  Initial troponin is in the normal range.  Repeat troponin was also in the normal range.  So do not suspect acute coronary syndrome.  Metabolic panel shows a slightly elevated glucose at 107.  Sodium potassium in the normal range.  Renal and liver function are in the normal range.  Lactate is in the normal range so do not suspect sepsis.  INR is 1.0.  CBC shows normal white blood cell count with mild anemia with a hemoglobin of 11.1.  Platelets within normal range.  Chest x-ray shows unchanged cardiomegaly/pericardial effusion.  No evidence of acute cardiopulmonary process.    Re-evaluation: Repeat evaluation at 1:05 PM shows the patient is feeling better.  Vital signs are stable.    Patient was treated in the emergency department with IV fluids, oral acetaminophen and IV Toradol.    Medications administered improved the patient's condition.    I suspect the patient's symptoms are related to her COVID.  She will be discharged home with medications for her symptoms.  I have recommended short-term follow-up with her primary care doctor.    Patient was instructed to have follow up with primary doctor or specialist within 1-3 days.      I discussed the results and discharge plan with the patient and/or family/friend if present.  I emphasized the importance of follow up with the physician I referred them to in the timeframe recommended.  I explained reasons for the patient to return to the Emergency Department.  Questions were addressed.  The patient and/or family/friend expressed understanding.      Shared decision making made with patient, and/or family, who agrees with plan.          Prescription Medication Consideration/Given:   ED Prescriptions       Medication Sig Dispense  Start Date End Date Auth. Provider    nirmatrelvir-ritonavir (Paxlovid) 300 mg (150 mg x 2)-100 mg tablet therapy pack Take 2 tablets by mouth 2 times a day for 5 days. Follow the instructions on the package 20 tablet 3/3/2025 3/8/2025 Dougie LUIS MD    albuterol 90 mcg/actuation inhaler Inhale 1-2 puffs every 6 hours if needed for wheezing or shortness of breath. 1 g 3/3/2025 4/2/2025 Dougie LUIS MD    hydrocodone-homatropine 5-1.5 mg/5 mL syrup Take 5 mL by mouth every 6 hours if needed for cough for up to 3 days. 60 mL 3/3/2025 3/6/2025 Dougie LUIS MD            Diagnosis:   1. COVID-19    2. Hypertension, unspecified type    3. Anemia, unspecified type                    Procedure  Procedures     Dougie LUIS MD  03/03/25 131

## 2025-03-04 LAB
ATRIAL RATE: 67 BPM
ATRIAL RATE: 84 BPM
P AXIS: 47 DEGREES
P AXIS: 53 DEGREES
P OFFSET: 191 MS
P OFFSET: 195 MS
P ONSET: 133 MS
P ONSET: 136 MS
PR INTERVAL: 174 MS
PR INTERVAL: 180 MS
Q ONSET: 223 MS
Q ONSET: 223 MS
QRS COUNT: 11 BEATS
QRS COUNT: 14 BEATS
QRS DURATION: 86 MS
QRS DURATION: 86 MS
QT INTERVAL: 380 MS
QT INTERVAL: 406 MS
QTC CALCULATION(BAZETT): 429 MS
QTC CALCULATION(BAZETT): 449 MS
QTC FREDERICIA: 421 MS
QTC FREDERICIA: 424 MS
R AXIS: 3 DEGREES
R AXIS: 7 DEGREES
T AXIS: 30 DEGREES
T AXIS: 43 DEGREES
T OFFSET: 413 MS
T OFFSET: 426 MS
VENTRICULAR RATE: 67 BPM
VENTRICULAR RATE: 84 BPM

## 2025-03-05 ENCOUNTER — APPOINTMENT (OUTPATIENT)
Dept: PHYSICAL THERAPY | Facility: CLINIC | Age: 70
End: 2025-03-05
Payer: MEDICARE

## 2025-03-13 ENCOUNTER — TREATMENT (OUTPATIENT)
Dept: PHYSICAL THERAPY | Facility: CLINIC | Age: 70
End: 2025-03-13
Payer: MEDICARE

## 2025-03-13 DIAGNOSIS — M54.10 BACK PAIN WITH RADICULOPATHY: ICD-10-CM

## 2025-03-13 DIAGNOSIS — M54.50 LUMBAR PAIN: Primary | ICD-10-CM

## 2025-03-13 DIAGNOSIS — M54.12 CERVICAL RADICULOPATHY: ICD-10-CM

## 2025-03-13 PROCEDURE — 97110 THERAPEUTIC EXERCISES: CPT | Mod: GP

## 2025-03-13 NOTE — PROGRESS NOTES
Physical Therapy Treatment    Patient Name: Ida Wynne  MRN: [id number]  Today's Date: 3/13/2025  Time Calculation  Start Time: 0832  Stop Time: 0900  Time Calculation (min): 28 min  PT Therapeutic Procedures Time Entry  Therapeutic Exercise Time Entry: 28       Insurance   Visit 10/11 (6/6 approved) (1 approved eval)     [employer] MED[employer] APPROVED 4 PT VISITS 2-17-25 THRU 3-14-25  AUTH# [id number]  [id number]    Current Problem  1. Lumbar pain  Follow Up In Physical Therapy      2. Cervical radiculopathy  Follow Up In Physical Therapy      3. Back pain with radiculopathy  Follow Up In Physical Therapy            Subjective   General   Pt reports she is feeling better today with regards to her COVID. With her overall progress, she thinks COVID set her back a little bit but overall she feels she has improved with therapy.   Precautions     Pain   0/10    Objective   AROM    Cervical flexion: WNL    Cervical extension: WNL    Cervical sidebending Right: limited 75%   Sidebending Left: limited 75%    Cervical rotation Right: limited 75%    Rotation Left: limited 75%     MMT    Shoulder deltoid flexion right: 5    Deltoid flexion left: 5    (C5) Shoulder deltoid abduction right: 5   Deltoid abduction left: 5    Shoulder ER @ 0 degrees abduction right: 5    ER @ 0 degrees abduction left: 5    Shoulder IR @ 0 degrees abduction right: 5    IR @ 0 degrees abduction left: 5    (C6) Biceps brachii right: 5    Biceps brachii left: 5    (C7) Triceps brachii right: 5    Triceps brachii left: 5    (C7) Wrist flexors right: 5      Wrist flexors left: 5      (C6) Wrist extensors right: 5     Wrist extensors left: 5      (C8) Thumb extension right: 4+      Thumb extension left: 4+      (T1) Finger abduction right: 4+      Finger abduction left: 4+          AROM    Lumbar flexion: 75%       Lumbar extension: limited 75%      Lumbar sidebending right:WNL    Sidebending left: limited WNL      Lumbar rotation right:  limited wnl     Rotation left: limited wnl         MMT:    (L3-L4) Right quadriceps: 5     Left quadriceps: 5      (L1-L2) Right iliopsoas: 5      Left iliopsoas: 5      (S2) Right hip abductors supine: 4+     Left hip abductors supine: 4+      (L1-L2) Right hip adductors supine: 5     Left hip adductors supine: 5      (L5-S1) Right gluteus daylin: 4+     Left gluteus daylin: 4 +     (S1-S2) Right hamstrings: 5      Left hamstrings: 5      Lower abdominals: fair      Upper abdominals: fair       Flexibility:    Right iliopsoas: limited     Left iliopsoas: limited      Right rectus femoris:  limited    Left rectus femoris: limited      Right hamstring: limited     Left hamstring: limited      Right piriformis: limited      Left piriformis: limited      Right quadriceps: limited     Left quadriceps: limited          Outcome Measures:  Other Measures  Oswestry Disablity Index (MIKEY): 4%   NDI 4%  Treatments:  Objective measures taken, goal review     Not Today  Prone lying  ANN MARIE  Prone press ups 2x10  Prone SLRs 2x10  LTR, 15 reps, 5 sec holds  Supine clamshells, BTB, 20 reps, 3 sec holds  Reverse crunches 2x10  Bridges x 10  STS with lordosis, 20 reps      Manual ([id number]): not today   LS: Grade 1-3 PA, Uni (R) mobs lumbar spine  Sacral mobs  STM Piriformis BL    Assessment:   Today's session focused on pt discharge from skilled physical therapy. Pt demonstrated improvements in pain and strength, meeting those goals set for her at initial evaluation. Pt also had great improvements with outcome measures, meeting both of those goals as well. At this time, pt states she is at 90% improvement in function, meeting that goal also. Pt nearly met all other goals. She also states she is confident in her abilities to maintain current progress on her own and to manage any future flare ups of symptoms. Pt provided and educated on comprehensive HEP to manage her care at home. Pt demonstrated competence with all HEP  exercises. Pt educated about results from objective testing, goal review, and rationale for recommended discharge. Pt demonstrated good understanding of all education provided today. At this time, it is recommended pt be formally discharged from skilled PT to manage her progress made on her own with comprehensive HEP.    Plan:  It is recommended pt be formally discharged from skilled PT to manage her progress made on her own with comprehensive HEP.    OP EDUCATION/HEP:   Access Code: [email or web address]  URL: [email or web address]  Date: 03/13/2025  Prepared by: Mounika Chowdhury    Exercises  - Lying Prone  - 1 x daily - 4 x weekly - 2 sets - 1 reps - 1-2 min hold  - Static Prone on Elbows  - 1 x daily - 4 x weekly - 2 sets - 1 reps - 1-2min hold  - Prone Push Ups on Forearms  - 1 x daily - 4 x weekly - 2 sets - 10 reps  - Prone Hip Extension  - 1 x daily - 4 x weekly - 2 sets - 10 reps  - Supine Lower Trunk Rotation  - 1 x daily - 4 x weekly - 2 sets - 10 reps  - Hooklying Clamshell with Resistance  - 1 x daily - 4 x weekly - 2 sets - 10 reps  - Supine Bridge  - 1 x daily - 4 x weekly - 2 sets - 10 reps  - Sit to Stand  - 1 x daily - 4 x weekly - 2 sets - 10 reps  - Standing Lumbar Extension with Counter  - 1 x daily - 4 x weekly - 2 sets - 10 reps  - Seated Scapular Retraction  - 1 x daily - 4 x weekly - 2 sets - 10 reps  - Seated Cervical Retraction  - 1 x daily - 4 x weekly - 2 sets - 10 reps  - Seated Shoulder Horizontal Abduction with Resistance  - 1 x daily - 4 x weekly - 2 sets - 10 reps  - Standing Shoulder Row with Anchored Resistance  - 1 x daily - 4 x weekly - 2 sets - 10 reps  - Shoulder extension with resistance - Neutral  - 1 x daily - 4 x weekly - 2 sets - 10 reps  - Shoulder External Rotation and Scapular Retraction with Resistance  - 1 x daily - 4 x weekly - 2 sets - 10 reps    Goals (met or nearly met):   Pt will report dec of 6% on NDI (MDC) in order to improve functional  mobility-met  Pt will be independent with HEP-met  Pt will demonstrate an increase in neck ROM to WNL in order to return to ADLs- nearly met  Pt will demonstrate global shoulder strength to 4+/5 in order to return to ADLs-met  Pt will report dec in pain to 0-1/10 in order to improve functional mobility-met  Pt will report 75% improvement in function in order to return to ADLs -met     Pt will report dec of 6% on MIKEY (MDC) in order to improve functional mobility-met  Pt will be independent with HEP-met   Pt will demonstrate an increase in Lumbar  ROM to WNL in order to return to ADLs- nearly met  Pt will demonstrate global Hip strength to 4+/5 in order to return to ADLs-met  Pt will report dec in pain by 1-2 points in order to improve functional mobility-met  Pt will report 75% improvement in function in order to return to ADLs -met

## 2025-03-17 ENCOUNTER — TELEPHONE (OUTPATIENT)
Age: 70
End: 2025-03-17

## 2025-05-05 ENCOUNTER — HOSPITAL ENCOUNTER (OUTPATIENT)
Dept: RADIOLOGY | Facility: HOSPITAL | Age: 70
Discharge: HOME | End: 2025-05-05
Payer: MEDICARE

## 2025-05-05 DIAGNOSIS — R92.8 OTHER ABNORMAL AND INCONCLUSIVE FINDINGS ON DIAGNOSTIC IMAGING OF BREAST: ICD-10-CM

## 2025-05-05 PROCEDURE — 77065 DX MAMMO INCL CAD UNI: CPT | Mod: LEFT SIDE | Performed by: RADIOLOGY

## 2025-05-05 PROCEDURE — 76642 ULTRASOUND BREAST LIMITED: CPT | Mod: LT

## 2025-05-05 PROCEDURE — G0279 TOMOSYNTHESIS, MAMMO: HCPCS | Mod: LEFT SIDE | Performed by: RADIOLOGY

## 2025-05-05 PROCEDURE — 76642 ULTRASOUND BREAST LIMITED: CPT | Mod: LEFT SIDE | Performed by: RADIOLOGY

## 2025-05-05 PROCEDURE — 77061 BREAST TOMOSYNTHESIS UNI: CPT | Mod: LT

## 2025-05-06 ENCOUNTER — APPOINTMENT (OUTPATIENT)
Dept: RADIOLOGY | Facility: HOSPITAL | Age: 70
End: 2025-05-06
Payer: MEDICARE

## 2025-05-12 ENCOUNTER — OFFICE VISIT (OUTPATIENT)
Age: 70
End: 2025-05-12
Payer: MEDICARE

## 2025-05-12 VITALS
WEIGHT: 193.8 LBS | SYSTOLIC BLOOD PRESSURE: 127 MMHG | HEART RATE: 58 BPM | TEMPERATURE: 97.5 F | OXYGEN SATURATION: 100 % | HEIGHT: 63 IN | DIASTOLIC BLOOD PRESSURE: 68 MMHG | BODY MASS INDEX: 34.34 KG/M2 | RESPIRATION RATE: 14 BRPM

## 2025-05-12 DIAGNOSIS — Z12.31 BREAST CANCER SCREENING BY MAMMOGRAM: ICD-10-CM

## 2025-05-12 DIAGNOSIS — N64.4 BREAST PAIN, RIGHT: Primary | ICD-10-CM

## 2025-05-12 DIAGNOSIS — E66.811 OBESITY, CLASS I, BMI 30-34.9: ICD-10-CM

## 2025-05-12 PROCEDURE — G8427 DOCREV CUR MEDS BY ELIG CLIN: HCPCS | Performed by: SURGERY

## 2025-05-12 PROCEDURE — 99203 OFFICE O/P NEW LOW 30 MIN: CPT | Performed by: SURGERY

## 2025-05-12 PROCEDURE — 1123F ACP DISCUSS/DSCN MKR DOCD: CPT | Performed by: SURGERY

## 2025-05-12 PROCEDURE — 1090F PRES/ABSN URINE INCON ASSESS: CPT | Performed by: SURGERY

## 2025-05-12 PROCEDURE — 1159F MED LIST DOCD IN RCRD: CPT | Performed by: SURGERY

## 2025-05-12 PROCEDURE — G8400 PT W/DXA NO RESULTS DOC: HCPCS | Performed by: SURGERY

## 2025-05-12 PROCEDURE — G8417 CALC BMI ABV UP PARAM F/U: HCPCS | Performed by: SURGERY

## 2025-05-12 PROCEDURE — 1036F TOBACCO NON-USER: CPT | Performed by: SURGERY

## 2025-05-12 PROCEDURE — 1126F AMNT PAIN NOTED NONE PRSNT: CPT | Performed by: SURGERY

## 2025-05-12 PROCEDURE — 3017F COLORECTAL CA SCREEN DOC REV: CPT | Performed by: SURGERY

## 2025-05-12 RX ORDER — ERGOCALCIFEROL 1.25 MG/1
50000 CAPSULE, LIQUID FILLED ORAL WEEKLY
COMMUNITY
Start: 2025-02-24

## 2025-05-12 RX ORDER — ALBUTEROL SULFATE 90 UG/1
1-2 INHALANT RESPIRATORY (INHALATION) EVERY 6 HOURS PRN
COMMUNITY
Start: 2025-03-03

## 2025-05-12 RX ORDER — BUDESONIDE AND FORMOTEROL FUMARATE DIHYDRATE 160; 4.5 UG/1; UG/1
2 AEROSOL RESPIRATORY (INHALATION) DAILY
COMMUNITY
Start: 2025-04-09

## 2025-05-12 ASSESSMENT — ENCOUNTER SYMPTOMS
COUGH: 0
BACK PAIN: 1
CHEST TIGHTNESS: 0
NAUSEA: 0
ABDOMINAL PAIN: 0
SHORTNESS OF BREATH: 0
COLOR CHANGE: 0
VOMITING: 0
SORE THROAT: 0

## 2025-05-12 NOTE — PROGRESS NOTES
NEW BREAST PATIENT         SERVICE DATE: 5/12/25  SERVICE TIME:  10:42 AM EDT    REFERRED BY:  Onesimo Steinberg MD  REASON FOR TODAY'S VISIT:    Chief Complaint   Patient presents with    New Patient    Abnormal Ultrasound    Abnormal Mammogram     Was a BIRADS 3 Left Breast, repeat imaging on 05/05/2025 was BIRADS 2, showed stable ductal ectasia, intermittent right breast tenderness with pressure on the breasts,does self breast exams, does not feel anything in her breasts, denies skin changes, nipple drainage/retraction bilateral breasts        HISTORY AND CHIEF COMPLAINT:  Tory Cruz is a 70 y.o.  female who presents with the complaint of having New Patient, Abnormal Ultrasound, and Abnormal Mammogram (Was a BIRADS 3 Left Breast, repeat imaging on 05/05/2025 was BIRADS 2, showed stable ductal ectasia, intermittent right breast tenderness with pressure on the breasts,does self breast exams, does not feel anything in her breasts, denies skin changes, nipple drainage/retraction bilateral breasts)   She does not smoking or drink caffeine  She says the pain is intermittent but tolerable    BREAST HISTORY  Her past breast history (prior to this encounter) is as follows:  Abnormal mammogram:   Yes, BIRADS 3 left Breast,  05/05/2025 repeat imaging BIRADS 2 left breast  Abnormal Breast US:  Yes, BIRADS 3 11/26/2025 left breast, repeat imaging 05/05/2025 BIRADS 2  Breast biopsy:    No  Breast cysts:    No  Breast surgery:    No  Breast cancer              No    RISK FACTORS FOR BREAST CANCER:  Family History of Breast Cancer: No.  History of ovarian cancer: no  Ashkenazi Ancestry: no  Age at the birth of first child: 19  Age at the onset of menses: 15  Age at menopause: complete hysterectomy age 31   Hormonal therapy: no  Postmenopausal obesity: BMI 34.77    BRA SIZE: 38C    Past Medical History:   Diagnosis Date    Asthma      Past Surgical History:   Procedure Laterality Date    HYSTERECTOMY, TOTAL

## 2025-05-12 NOTE — PATIENT INSTRUCTIONS
Patient Education        Breast Pain: Care Instructions  Overview    Breast tenderness and pain may come and go with your monthly periods (cyclic), or it may not follow any pattern (noncyclic). Breast pain is rarely caused by a serious health problem. You may need tests to find the cause.  Follow-up care is a key part of your treatment and safety. Be sure to make and go to all appointments, and call your doctor if you are having problems. It's also a good idea to know your test results and keep a list of the medicines you take.  How can you care for yourself at home?  If your doctor gave you medicine, take it exactly as prescribed. Call your doctor if you think you are having a problem with your medicine.  Take an over-the-counter pain medicine, such as acetaminophen (Tylenol), ibuprofen (Advil, Motrin), or naproxen (Aleve), to relieve pain and swelling. Read and follow all instructions on the label.  Do not take two or more pain medicines at the same time unless the doctor told you to. Many pain medicines have acetaminophen, which is Tylenol. Too much acetaminophen (Tylenol) can be harmful.  Wear a supportive bra, such as a sports bra or a jog bra.  Try applying heat or ice to the area. If you are breastfeeding, use ice. If you are not breastfeeding, you can try heat or ice, whichever feels better. Apply it for 10 to 20 minutes at a time. Put a thin cloth between the heat or ice and your skin.  Get at least 30 minutes of exercise on most days of the week. For many people, walking is a good choice.  Keep a healthy sleep pattern. Go to bed at the same time every night, and get up at the same time every day.  When should you call for help?  Call your doctor now or seek immediate medical care if:  You have new changes in a breast, such as:  A lump or thickening in your breast or armpit.  A change in the breast's size or shape.  Skin changes, such as dimples or puckers.  Nipple discharge.  A change in the color or feel

## 2025-07-30 ENCOUNTER — APPOINTMENT (OUTPATIENT)
Dept: RADIOLOGY | Facility: HOSPITAL | Age: 70
End: 2025-07-30
Payer: MEDICARE

## 2025-07-30 ENCOUNTER — APPOINTMENT (OUTPATIENT)
Dept: CARDIOLOGY | Facility: HOSPITAL | Age: 70
End: 2025-07-30
Payer: MEDICARE

## 2025-07-30 ENCOUNTER — HOSPITAL ENCOUNTER (EMERGENCY)
Facility: HOSPITAL | Age: 70
Discharge: HOME | End: 2025-07-30
Attending: STUDENT IN AN ORGANIZED HEALTH CARE EDUCATION/TRAINING PROGRAM
Payer: MEDICARE

## 2025-07-30 VITALS
HEART RATE: 70 BPM | WEIGHT: 190 LBS | SYSTOLIC BLOOD PRESSURE: 166 MMHG | RESPIRATION RATE: 20 BRPM | DIASTOLIC BLOOD PRESSURE: 89 MMHG | HEIGHT: 62 IN | OXYGEN SATURATION: 96 % | TEMPERATURE: 97.7 F | BODY MASS INDEX: 34.96 KG/M2

## 2025-07-30 DIAGNOSIS — R10.13 EPIGASTRIC ABDOMINAL PAIN: Primary | ICD-10-CM

## 2025-07-30 LAB
ALBUMIN SERPL BCP-MCNC: 4 G/DL (ref 3.4–5)
ALP SERPL-CCNC: 56 U/L (ref 33–136)
ALT SERPL W P-5'-P-CCNC: 21 U/L (ref 7–45)
ANION GAP SERPL CALC-SCNC: 11 MMOL/L (ref 10–20)
APTT PPP: 29 SECONDS (ref 26–36)
AST SERPL W P-5'-P-CCNC: 23 U/L (ref 9–39)
ATRIAL RATE: 72 BPM
BASOPHILS # BLD AUTO: 0.02 X10*3/UL (ref 0–0.1)
BASOPHILS NFR BLD AUTO: 0.4 %
BILIRUB SERPL-MCNC: 0.7 MG/DL (ref 0–1.2)
BUN SERPL-MCNC: 13 MG/DL (ref 6–23)
CALCIUM SERPL-MCNC: 9.3 MG/DL (ref 8.6–10.3)
CARDIAC TROPONIN I PNL SERPL HS: <3 NG/L (ref 0–13)
CARDIAC TROPONIN I PNL SERPL HS: <3 NG/L (ref 0–13)
CHLORIDE SERPL-SCNC: 105 MMOL/L (ref 98–107)
CO2 SERPL-SCNC: 28 MMOL/L (ref 21–32)
CREAT SERPL-MCNC: 0.91 MG/DL (ref 0.5–1.05)
EGFRCR SERPLBLD CKD-EPI 2021: 68 ML/MIN/1.73M*2
EOSINOPHIL # BLD AUTO: 0.16 X10*3/UL (ref 0–0.7)
EOSINOPHIL NFR BLD AUTO: 3.3 %
ERYTHROCYTE [DISTWIDTH] IN BLOOD BY AUTOMATED COUNT: 14.3 % (ref 11.5–14.5)
GLUCOSE SERPL-MCNC: 103 MG/DL (ref 74–99)
HCT VFR BLD AUTO: 35.8 % (ref 36–46)
HGB BLD-MCNC: 10.9 G/DL (ref 12–16)
IMM GRANULOCYTES # BLD AUTO: 0.01 X10*3/UL (ref 0–0.7)
IMM GRANULOCYTES NFR BLD AUTO: 0.2 % (ref 0–0.9)
INR PPP: 1 (ref 0.9–1.1)
LACTATE SERPL-SCNC: 0.7 MMOL/L (ref 0.4–2)
LIPASE SERPL-CCNC: 14 U/L (ref 9–82)
LYMPHOCYTES # BLD AUTO: 2.13 X10*3/UL (ref 1.2–4.8)
LYMPHOCYTES NFR BLD AUTO: 43.6 %
MCH RBC QN AUTO: 23.7 PG (ref 26–34)
MCHC RBC AUTO-ENTMCNC: 30.4 G/DL (ref 32–36)
MCV RBC AUTO: 78 FL (ref 80–100)
MONOCYTES # BLD AUTO: 0.42 X10*3/UL (ref 0.1–1)
MONOCYTES NFR BLD AUTO: 8.6 %
NEUTROPHILS # BLD AUTO: 2.15 X10*3/UL (ref 1.2–7.7)
NEUTROPHILS NFR BLD AUTO: 43.9 %
NRBC BLD-RTO: 0 /100 WBCS (ref 0–0)
P AXIS: 38 DEGREES
P OFFSET: 199 MS
P ONSET: 143 MS
PLATELET # BLD AUTO: 155 X10*3/UL (ref 150–450)
POTASSIUM SERPL-SCNC: 3.9 MMOL/L (ref 3.5–5.3)
PR INTERVAL: 166 MS
PROT SERPL-MCNC: 7.1 G/DL (ref 6.4–8.2)
PROTHROMBIN TIME: 10.8 SECONDS (ref 9.8–12.4)
Q ONSET: 226 MS
QRS COUNT: 11 BEATS
QRS DURATION: 84 MS
QT INTERVAL: 388 MS
QTC CALCULATION(BAZETT): 424 MS
QTC FREDERICIA: 412 MS
R AXIS: -36 DEGREES
RBC # BLD AUTO: 4.59 X10*6/UL (ref 4–5.2)
SODIUM SERPL-SCNC: 140 MMOL/L (ref 136–145)
T AXIS: 7 DEGREES
T OFFSET: 420 MS
VENTRICULAR RATE: 72 BPM
WBC # BLD AUTO: 4.9 X10*3/UL (ref 4.4–11.3)

## 2025-07-30 PROCEDURE — 71275 CT ANGIOGRAPHY CHEST: CPT | Performed by: RADIOLOGY

## 2025-07-30 PROCEDURE — 83605 ASSAY OF LACTIC ACID: CPT

## 2025-07-30 PROCEDURE — 93005 ELECTROCARDIOGRAM TRACING: CPT

## 2025-07-30 PROCEDURE — 96375 TX/PRO/DX INJ NEW DRUG ADDON: CPT | Mod: 59

## 2025-07-30 PROCEDURE — 84075 ASSAY ALKALINE PHOSPHATASE: CPT

## 2025-07-30 PROCEDURE — 96374 THER/PROPH/DIAG INJ IV PUSH: CPT | Mod: 59

## 2025-07-30 PROCEDURE — 84484 ASSAY OF TROPONIN QUANT: CPT

## 2025-07-30 PROCEDURE — 71275 CT ANGIOGRAPHY CHEST: CPT

## 2025-07-30 PROCEDURE — 83690 ASSAY OF LIPASE: CPT

## 2025-07-30 PROCEDURE — 85730 THROMBOPLASTIN TIME PARTIAL: CPT

## 2025-07-30 PROCEDURE — 74177 CT ABD & PELVIS W/CONTRAST: CPT | Performed by: RADIOLOGY

## 2025-07-30 PROCEDURE — 2500000004 HC RX 250 GENERAL PHARMACY W/ HCPCS (ALT 636 FOR OP/ED)

## 2025-07-30 PROCEDURE — 99285 EMERGENCY DEPT VISIT HI MDM: CPT | Mod: 25 | Performed by: STUDENT IN AN ORGANIZED HEALTH CARE EDUCATION/TRAINING PROGRAM

## 2025-07-30 PROCEDURE — 2550000001 HC RX 255 CONTRASTS: Performed by: STUDENT IN AN ORGANIZED HEALTH CARE EDUCATION/TRAINING PROGRAM

## 2025-07-30 PROCEDURE — 85025 COMPLETE CBC W/AUTO DIFF WBC: CPT

## 2025-07-30 PROCEDURE — 74177 CT ABD & PELVIS W/CONTRAST: CPT

## 2025-07-30 PROCEDURE — 96361 HYDRATE IV INFUSION ADD-ON: CPT

## 2025-07-30 PROCEDURE — 36415 COLL VENOUS BLD VENIPUNCTURE: CPT

## 2025-07-30 RX ORDER — FAMOTIDINE 20 MG/1
20 TABLET, FILM COATED ORAL 2 TIMES DAILY
Qty: 20 TABLET | Refills: 0 | Status: SHIPPED | OUTPATIENT
Start: 2025-07-30 | End: 2025-08-09

## 2025-07-30 RX ORDER — ONDANSETRON HYDROCHLORIDE 2 MG/ML
4 INJECTION, SOLUTION INTRAVENOUS ONCE
Status: COMPLETED | OUTPATIENT
Start: 2025-07-30 | End: 2025-07-30

## 2025-07-30 RX ORDER — FENTANYL CITRATE 50 UG/ML
50 INJECTION, SOLUTION INTRAMUSCULAR; INTRAVENOUS ONCE
Status: DISCONTINUED | OUTPATIENT
Start: 2025-07-30 | End: 2025-07-30

## 2025-07-30 RX ORDER — KETOROLAC TROMETHAMINE 30 MG/ML
15 INJECTION, SOLUTION INTRAMUSCULAR; INTRAVENOUS ONCE
Status: COMPLETED | OUTPATIENT
Start: 2025-07-30 | End: 2025-07-30

## 2025-07-30 RX ORDER — FAMOTIDINE 10 MG/ML
20 INJECTION, SOLUTION INTRAVENOUS ONCE
Status: COMPLETED | OUTPATIENT
Start: 2025-07-30 | End: 2025-07-30

## 2025-07-30 RX ORDER — ONDANSETRON 4 MG/1
4 TABLET, ORALLY DISINTEGRATING ORAL EVERY 8 HOURS PRN
Qty: 20 TABLET | Refills: 0 | Status: SHIPPED | OUTPATIENT
Start: 2025-07-30 | End: 2025-08-06

## 2025-07-30 RX ORDER — ACETAMINOPHEN 500 MG
500 TABLET ORAL EVERY 6 HOURS PRN
Qty: 28 TABLET | Refills: 0 | Status: SHIPPED | OUTPATIENT
Start: 2025-07-30 | End: 2025-08-06

## 2025-07-30 RX ORDER — LIDOCAINE 560 MG/1
1 PATCH PERCUTANEOUS; TOPICAL; TRANSDERMAL DAILY
Qty: 5 PATCH | Refills: 0 | Status: SHIPPED | OUTPATIENT
Start: 2025-07-30 | End: 2025-07-30 | Stop reason: SINTOL

## 2025-07-30 RX ADMIN — KETOROLAC TROMETHAMINE 15 MG: 30 INJECTION, SOLUTION INTRAMUSCULAR at 08:24

## 2025-07-30 RX ADMIN — FAMOTIDINE 20 MG: 10 INJECTION, SOLUTION INTRAVENOUS at 07:59

## 2025-07-30 RX ADMIN — IOHEXOL 75 ML: 350 INJECTION, SOLUTION INTRAVENOUS at 08:51

## 2025-07-30 RX ADMIN — SODIUM CHLORIDE 1000 ML: 0.9 INJECTION, SOLUTION INTRAVENOUS at 07:57

## 2025-07-30 RX ADMIN — ONDANSETRON 4 MG: 2 INJECTION INTRAMUSCULAR; INTRAVENOUS at 07:59

## 2025-07-30 ASSESSMENT — PAIN DESCRIPTION - DESCRIPTORS
DESCRIPTORS: BURNING
DESCRIPTORS: STABBING;THROBBING

## 2025-07-30 ASSESSMENT — PAIN SCALES - GENERAL
PAINLEVEL_OUTOF10: 8
PAINLEVEL_OUTOF10: 4
PAINLEVEL_OUTOF10: 7
PAINLEVEL_OUTOF10: 3

## 2025-07-30 ASSESSMENT — PAIN - FUNCTIONAL ASSESSMENT
PAIN_FUNCTIONAL_ASSESSMENT: 0-10

## 2025-07-30 ASSESSMENT — PAIN DESCRIPTION - ONSET: ONSET: SUDDEN

## 2025-07-30 ASSESSMENT — PAIN DESCRIPTION - PAIN TYPE
TYPE: ACUTE PAIN
TYPE: ACUTE PAIN

## 2025-07-30 ASSESSMENT — PAIN DESCRIPTION - PROGRESSION
CLINICAL_PROGRESSION: NOT CHANGED
CLINICAL_PROGRESSION: GRADUALLY IMPROVING

## 2025-07-30 ASSESSMENT — PAIN DESCRIPTION - ORIENTATION
ORIENTATION: LEFT
ORIENTATION: MID

## 2025-07-30 ASSESSMENT — PAIN DESCRIPTION - FREQUENCY: FREQUENCY: CONSTANT/CONTINUOUS

## 2025-07-30 ASSESSMENT — PAIN DESCRIPTION - LOCATION
LOCATION: CHEST
LOCATION: CHEST

## 2025-07-30 NOTE — ED PROVIDER NOTES
HPI   Chief Complaint   Patient presents with    Chest Pain     Pain under left breast since noon yesterday.       History provided by: Patient    Limitations to history: None    CC: Chest pain    HPI: 70-year-old female with a history of hypertension, asthma, cervical and lumbar disc disease presents emergency department to be evaluated for chest pain.  Patient states that her pain started yesterday.  She localized the pain to the left ribs and area under her left breast.  The pain is characterized as sharp.  The pain is there constantly but worse with breathing and certain movements.  She denies any injury.  Denies fever and chills.  Denies shortness of breath.  Denies hemoptysis.  She has a history of asthma and uses an inhaler as needed but otherwise denies history of heart or lung disease including ACS, arrhythmia, heart failure, DVT or PE.  Denies recent plane flights, recent surgeries, history of cancer, the use of estrogen.  Denies abdominal pain or flank pain.  Denies nausea vomiting.  Denies diarrhea or constipation.  Denies any urinary complaints.  Patient has been trying multiple over-the-counter remedies that have not been helping much with her discomfort. denies blood in the urine or stool.  Denies weakness and fatigue.  Denies all other systemic symptoms.    ROS: Negative unless mentioned in HPI    Medical Hx: Allergies reviewed.  Immunizations are up-to-date.    Physical exam:    Constitutional: Sitting comfortably in the room and in no distress.  Oriented to person, place, time, and situation.    HEENT: Head is normocephalic, atraumatic. Patient's airway is patent.  Tympanic membranes are clear bilaterally.  Nasal mucosa clear.  Mouth with normal mucosa.  Throat is not erythematous and there are no oropharyngeal exudates, uvula is midline.  No obvious facial deformities.    Eyes: Clear bilaterally.  Pupils are equal round and reactive to light and accommodation.  Extraocular movements intact.       Cardiac: Regular rate, regular rhythm.  Heart sounds S1, S2.  No murmurs, rubs, or gallops.  PMI nondisplaced.  No JVD.    Respiratory: Regular respiratory rate and effort.  Breath sounds are clear and equal bilaterally, no adventitious lung sounds.  Patient is speaking in full sentences and is in no apparent respiratory distress. No use of accessory muscles.      Gastrointestinal: Epigastric abdominal tenderness to palpation.  Abdomen is soft and nondistended.  Guarding noted.  There are no obvious deformities.   Bowel sounds are normal active.    Genitourinary: No CVA or flank tenderness.    Musculoskeletal: No reproducible tenderness.  No obvious skin or bony deformities.  Patient has equal range of motion in all extremities and no strength deficiencies.  No muscle or joint tenderness. No back or neck tenderness.  Capillary refill less than 3 seconds.  Strong peripheral pulses.  No sensory deficits.    Neurological: Patient is alert and oriented.  No focal deficits.  5/5 strength in all extremities.  Cranial nerves II through XII intact. GCS15.     Skin: Skin is normal color for race and is warm, dry, and intact.  No evidence of trauma.  No lesions, rashes, bruising, jaundice, or masses.    Psych: Appropriate mood and affect.  No apparent risk to self or others.    Heme/lymph: No adenopathy, lymphadenopathy, or splenomegaly    Physical exam is otherwise negative unless stated above or in history of present illness.              Patient History   Medical History[1]  Surgical History[2]  Family History[3]  Social History[4]    Physical Exam   ED Triage Vitals   Temp Pulse Resp BP   -- -- -- --      SpO2 Temp src Heart Rate Source Patient Position   -- -- -- --      BP Location FiO2 (%)     -- --       Physical Exam      ED Course & MDM   Diagnoses as of 07/30/25 1001   Epigastric abdominal pain        Patient updated on plan for lab testing, IV insertion, radiology imaging, and medications to be administered  while in the ER (if indicated). Patient updated on expected wait times for testing and results. Patient provided my name and told to ask any staff member for questions or concerns if they should arise. Electronic medical record reviewed.     MDM    Patient presented to the emergency department with the chief complaint abdominal pain.  Epigastric abdominal tenderness to palpation.  Abdomen is soft and nondistended.  Guarding noted.  There are no obvious deformities.   Bowel sounds are normal active.examination of her heart and lungs unremarkable.  On arrival to the emergency department, vital signs were within normal limits    EKG performed at 731 and interpreted by me.  Normal sinus rhythm 72 beats minute.  Normal axis.  There is no ST elevation or depression.  No prolonged QT.  Right bundle branch block noted.    Will obtain basic blood work, EKG and troponin, lactate and lipase, coagulation screen, CT abdomen and pelvis, and CTA to evaluate for pneumonia or PE.  Patient was given IV fluids as well as Pepcid given the epigastric pain, prophylactic Zofran, and fentanyl.    Patient is feeling much better after medications.  She is resting comfortably and in no acute distress.  Vital signs are within normal limits and her blood pressure has significantly improved.  I updated her on her results.  CBC reveals a microcytic anemia similar to baseline.  CMP reveals hyperglycemia 103 but otherwise unremarkable.  Lactate is 0.7.  Lipase is 14.  Troponin's are within normal and making ACS less likely.  Heart score is reassuring at 3.  CT abdomen and pelvis reveals nonspecific fluid in the pelvis but no acute abnormality.  CTA of the chest reveals no pneumonia or PE.  Based on the patient's characterization of her pain and location, I believe she may be experiencing peptic ulcer disease or gastritis.  Patient does feel well and feels comfortable going home.  Will start the patient on Pepcid and Zofran.  I discussed supportive  treatment.  She will follow-up with GI, she may require an endoscope.  All questions and concerns addressed.  Reasons to return to ER discussed.  Patient verbalized understanding and agreement with the treatment plan and they remained hemodynamically stable in the ER.    This note was dictated using a speech recognition program.  While an attempt was made at proof-reading to minimize errors, minor errors in transcription may be present         No data recorded     Kevin Coma Scale Score: 15 (07/30/25 0733 : Dilshad Castellanos RN)                           Medical Decision Making      Procedure  Procedures         [1]   Past Medical History:  Diagnosis Date    Asthma    [2]   Past Surgical History:  Procedure Laterality Date    US ASPIRATION INJECTION INTERMEDIATE JOINT  1/10/2020    US ASPIRATION INJECTION INTERMEDIATE JOINT 1/10/2020 [facility]   [3] No family history on file.  [4]   Social History  Tobacco Use    Smoking status: Never    Smokeless tobacco: Never   Vaping Use    Vaping status: Never Used   Substance Use Topics    Alcohol use: Never    Drug use: Never        Yordan Saab PA-C  07/30/25 6093

## 2025-08-27 ENCOUNTER — HOSPITAL ENCOUNTER (OUTPATIENT)
Dept: RADIOLOGY | Facility: CLINIC | Age: 70
Discharge: HOME | End: 2025-08-27
Payer: MEDICARE

## 2025-08-27 ENCOUNTER — OFFICE VISIT (OUTPATIENT)
Dept: ORTHOPEDIC SURGERY | Facility: CLINIC | Age: 70
End: 2025-08-27
Payer: MEDICARE

## 2025-08-27 DIAGNOSIS — M25.572 LEFT ANKLE PAIN, UNSPECIFIED CHRONICITY: ICD-10-CM

## 2025-08-27 DIAGNOSIS — M76.60 INSERTIONAL ACHILLES TENDINOPATHY: Primary | ICD-10-CM

## 2025-08-27 DIAGNOSIS — M79.672 LEFT FOOT PAIN: ICD-10-CM

## 2025-08-27 PROCEDURE — 73610 X-RAY EXAM OF ANKLE: CPT | Mod: LT

## 2025-08-27 PROCEDURE — 99212 OFFICE O/P EST SF 10 MIN: CPT | Performed by: INTERNAL MEDICINE

## 2025-08-27 PROCEDURE — 1159F MED LIST DOCD IN RCRD: CPT | Performed by: INTERNAL MEDICINE

## 2025-08-27 PROCEDURE — 1036F TOBACCO NON-USER: CPT | Performed by: INTERNAL MEDICINE

## 2025-08-27 PROCEDURE — 99204 OFFICE O/P NEW MOD 45 MIN: CPT | Performed by: INTERNAL MEDICINE

## 2025-08-27 PROCEDURE — 73610 X-RAY EXAM OF ANKLE: CPT | Mod: LEFT SIDE | Performed by: INTERNAL MEDICINE

## 2025-08-27 RX ORDER — METHYLPREDNISOLONE 4 MG/1
TABLET ORAL
Qty: 21 TABLET | Refills: 0 | Status: SHIPPED | OUTPATIENT
Start: 2025-08-27

## 2025-08-29 ENCOUNTER — EVALUATION (OUTPATIENT)
Dept: PHYSICAL THERAPY | Facility: CLINIC | Age: 70
End: 2025-08-29
Payer: MEDICARE

## 2025-08-29 DIAGNOSIS — M76.60 INSERTIONAL ACHILLES TENDINOPATHY: Primary | ICD-10-CM

## 2025-08-29 PROCEDURE — 97161 PT EVAL LOW COMPLEX 20 MIN: CPT | Mod: GP

## 2025-08-29 PROCEDURE — 97110 THERAPEUTIC EXERCISES: CPT | Mod: GP

## 2025-09-04 ENCOUNTER — TREATMENT (OUTPATIENT)
Dept: PHYSICAL THERAPY | Facility: CLINIC | Age: 70
End: 2025-09-04
Payer: MEDICARE

## 2025-09-04 DIAGNOSIS — M76.60 INSERTIONAL ACHILLES TENDINOPATHY: Primary | ICD-10-CM

## 2025-09-04 PROCEDURE — 97110 THERAPEUTIC EXERCISES: CPT | Mod: GP,CQ

## 2025-09-04 PROCEDURE — 97140 MANUAL THERAPY 1/> REGIONS: CPT | Mod: GP,CQ

## 2025-09-04 ASSESSMENT — PAIN - FUNCTIONAL ASSESSMENT: PAIN_FUNCTIONAL_ASSESSMENT: 0-10

## 2025-09-04 ASSESSMENT — PAIN DESCRIPTION - DESCRIPTORS: DESCRIPTORS: ACHING;TIGHTNESS

## 2025-09-04 ASSESSMENT — PAIN SCALES - GENERAL: PAINLEVEL_OUTOF10: 3
